# Patient Record
Sex: FEMALE | Race: BLACK OR AFRICAN AMERICAN | NOT HISPANIC OR LATINO | Employment: FULL TIME | URBAN - METROPOLITAN AREA
[De-identification: names, ages, dates, MRNs, and addresses within clinical notes are randomized per-mention and may not be internally consistent; named-entity substitution may affect disease eponyms.]

---

## 2019-03-16 ENCOUNTER — HOSPITAL ENCOUNTER (EMERGENCY)
Facility: HOSPITAL | Age: 34
Discharge: HOME | End: 2019-03-16
Attending: FAMILY MEDICINE
Payer: COMMERCIAL

## 2019-03-16 VITALS
TEMPERATURE: 99 F | OXYGEN SATURATION: 97 % | RESPIRATION RATE: 20 BRPM | SYSTOLIC BLOOD PRESSURE: 125 MMHG | DIASTOLIC BLOOD PRESSURE: 84 MMHG | HEART RATE: 133 BPM

## 2019-03-16 DIAGNOSIS — R50.9 FEVER: ICD-10-CM

## 2019-03-16 DIAGNOSIS — R68.89 FLU-LIKE SYMPTOMS: Primary | ICD-10-CM

## 2019-03-16 DIAGNOSIS — M79.602 LEFT ARM PAIN: ICD-10-CM

## 2019-03-16 LAB
INFLUENZA A, MOLECULAR: NEGATIVE
INFLUENZA B, MOLECULAR: NEGATIVE
SPECIMEN SOURCE: NORMAL

## 2019-03-16 PROCEDURE — 87502 INFLUENZA DNA AMP PROBE: CPT | Mod: ER

## 2019-03-16 PROCEDURE — 93010 ELECTROCARDIOGRAM REPORT: CPT | Mod: ,,, | Performed by: INTERNAL MEDICINE

## 2019-03-16 PROCEDURE — 25000003 PHARM REV CODE 250: Mod: ER | Performed by: PHYSICIAN ASSISTANT

## 2019-03-16 PROCEDURE — 93010 EKG 12-LEAD: ICD-10-PCS | Mod: ,,, | Performed by: INTERNAL MEDICINE

## 2019-03-16 PROCEDURE — 93005 ELECTROCARDIOGRAM TRACING: CPT | Mod: ER

## 2019-03-16 PROCEDURE — 99283 EMERGENCY DEPT VISIT LOW MDM: CPT | Mod: ER

## 2019-03-16 RX ORDER — ACETAMINOPHEN 325 MG/1
650 TABLET ORAL
Status: COMPLETED | OUTPATIENT
Start: 2019-03-16 | End: 2019-03-16

## 2019-03-16 RX ORDER — IBUPROFEN 600 MG/1
600 TABLET ORAL
Status: COMPLETED | OUTPATIENT
Start: 2019-03-16 | End: 2019-03-16

## 2019-03-16 RX ADMIN — ACETAMINOPHEN 650 MG: 325 TABLET ORAL at 12:03

## 2019-03-16 RX ADMIN — IBUPROFEN 600 MG: 600 TABLET, FILM COATED ORAL at 12:03

## 2019-03-16 NOTE — DISCHARGE INSTRUCTIONS
Rotate tylenol and motrin as needed for fever.  Rest. Drink plenty of fluids.  Follow up with your primary care provider.  For worsening symptoms, chest pain, shortness of breath, increased abdominal pain, high grade fever, stroke or stroke like symptoms, immediately go to the nearest Emergency Room or call 911 as soon as possible.

## 2019-03-16 NOTE — ED PROVIDER NOTES
Encounter Date: 3/16/2019       History     Chief Complaint   Patient presents with    Influenza     fever and chills since 12:00 am last night. Motrin last at 2:00 am      Patient is a 33-year-old female who presents with flu-like symptoms for approximately 12 hr.  She denies past medical history.  She states she had pain in the left arm yesterday and then developed a fever around midnight.  She took Motrin around 2:00 a.m. with no other attempted treatment.  She denies any other associated symptoms including cough, rhinorrhea, congestion, sore throat, nausea, diarrhea or urinary symptoms. She states she has an  and denies any known sick contacts.      The history is provided by the patient.     Review of patient's allergies indicates:  No Known Allergies  Past Medical History:   Diagnosis Date    Allergy      Past Surgical History:   Procedure Laterality Date    FOOT SURGERY      TONSILLECTOMY       Family History   Problem Relation Age of Onset    Hypertension Mother     Cancer Father     Cancer Paternal Grandmother     Cancer Paternal Grandfather      Social History     Tobacco Use    Smoking status: Never Smoker    Smokeless tobacco: Never Used   Substance Use Topics    Alcohol use: No    Drug use: No     Review of Systems   Constitutional: Positive for fever. Negative for activity change, appetite change and chills.   HENT: Negative for congestion, rhinorrhea and sore throat.    Eyes: Negative for redness and visual disturbance.   Respiratory: Negative for cough, chest tightness and shortness of breath.    Cardiovascular: Negative for chest pain.   Gastrointestinal: Negative for abdominal pain, diarrhea, nausea and vomiting.   Genitourinary: Negative for dysuria and frequency.   Musculoskeletal: Positive for myalgias. Negative for back pain, neck pain and neck stiffness.   Skin: Negative for rash.   Neurological: Negative for dizziness, syncope, numbness and headaches.       Physical Exam      Vitals:    03/16/19 1244 03/16/19 1331   BP: 125/74 125/84   BP Location:  Left arm   Patient Position:  Sitting   Pulse: (!) 164 (!) 133   Resp: 16 20   Temp: (!) 102.3 °F (39.1 °C) (!) 101.6 °F (38.7 °C)   TempSrc: Oral Oral   SpO2: 99% 97%       Physical Exam    Nursing note and vitals reviewed.  Constitutional: She appears well-developed and well-nourished. She is cooperative.  Non-toxic appearance. She does not have a sickly appearance.   HENT:   Head: Normocephalic and atraumatic.   Right Ear: External ear normal.   Left Ear: External ear normal.   Nose: Nose normal.   Mouth/Throat: Oropharynx is clear and moist.   Eyes: Conjunctivae and lids are normal. Pupils are equal, round, and reactive to light.   Neck: Normal range of motion and full passive range of motion without pain. Neck supple.   Cardiovascular: Regular rhythm and normal heart sounds. Tachycardia present.  Exam reveals no gallop and no friction rub.    No murmur heard.  Pulmonary/Chest: Breath sounds normal. She has no wheezes. She has no rhonchi. She has no rales.   Abdominal: Normal appearance.   Neurological: She is alert and oriented to person, place, and time.   Skin: Skin is warm, dry and intact. No rash noted.         ED Course   Procedures  Labs Reviewed   INFLUENZA A & B BY MOLECULAR     EKG Readings: (Independently Interpreted)   Rhythm: Sinus Tachycardia. Heart Rate: 157. Ectopy: No Ectopy. Conduction: Normal. Axis: Normal. Clinical Impression: Sinus Tachycardia       Imaging Results    None          Medical Decision Making:   Initial Assessment:   Patient is a 33-year-old female who presents with flu-like symptoms for approximately 12 hr.  She denies past medical history.  She states she had pain in the left arm yesterday and then developed a fever around midnight.  She took Motrin around 2:00 a.m. with no other attempted treatment.  She denies any other associated symptoms including cough, rhinorrhea, congestion, sore throat,  nausea, diarrhea or urinary symptoms. She states she has an  and denies any known sick contacts.  Differential Diagnosis:   Influenza  Pneumonia  Strep    Clinical Tests:   Lab Tests: Ordered and Reviewed  ED Management:  Urgent evaluation of a 33 year old female who presents with flu like symptoms. Vitals signs reviewed. Patient is well appearing. Abdomen is soft and non-tender with no rebound or guarding. I doubt acute intra-abdominal process. Bilateral TM's with no erythema. I doubt otitis media. No tonsillar swelling, erythema or exudate. I doubt strep pharyngitis. Breath sounds are clear and equal bilaterally. I doubt pneumonia. Fever improved with anti-pyretics. She reports pain to the left arm that has resolved. She denied shortness of breath or chest pain. Oral hydration and fever control discussed. Discussed results with patient. Return precautions given. Based on my clinical evaluation, I do not appreciate any immediate, emergent, or life threatening condition or etiology that warrants additional workup today and feel that the patient can be discharged with close follow up care.  Patient is to follow up with their primary care provider. All questions answered.                         Clinical Impression:       ICD-10-CM ICD-9-CM   1. Flu-like symptoms R68.89 780.99   2. Left arm pain M79.602 729.5   3. Fever R50.9 780.60                                Bonnie Stahl PA-C  03/16/19 4923

## 2019-03-26 ENCOUNTER — OFFICE VISIT (OUTPATIENT)
Dept: FAMILY MEDICINE | Facility: CLINIC | Age: 34
End: 2019-03-26
Payer: COMMERCIAL

## 2019-03-26 VITALS
WEIGHT: 153 LBS | OXYGEN SATURATION: 99 % | SYSTOLIC BLOOD PRESSURE: 134 MMHG | BODY MASS INDEX: 24.01 KG/M2 | TEMPERATURE: 99 F | HEART RATE: 96 BPM | DIASTOLIC BLOOD PRESSURE: 82 MMHG | HEIGHT: 67 IN

## 2019-03-26 DIAGNOSIS — R07.89 ATYPICAL CHEST PAIN: ICD-10-CM

## 2019-03-26 DIAGNOSIS — R94.31 ABNORMAL EKG: ICD-10-CM

## 2019-03-26 DIAGNOSIS — Z00.00 ROUTINE HEALTH MAINTENANCE: Primary | ICD-10-CM

## 2019-03-26 PROCEDURE — 99395 PR PREVENTIVE VISIT,EST,18-39: ICD-10-PCS | Mod: S$GLB,,, | Performed by: FAMILY MEDICINE

## 2019-03-26 PROCEDURE — 99395 PREV VISIT EST AGE 18-39: CPT | Mod: S$GLB,,, | Performed by: FAMILY MEDICINE

## 2019-03-26 NOTE — PROGRESS NOTES
" Patient ID: Alejandrina Eli is a 33 y.o. female.    Chief Complaint: Annual Exam    HPI      Alejandrina Eli is a 33 y.o. female. here for annual exam.   Seen at er for flu and pain in left side of neck and arm.  Came back after flu resolved.  No chest pain on exertion.  No PND orthopnea.  However pain is intermittent again on the left side of neck.    Seen by work md- 2 weeks prior for flu type illness          Review of Symptoms    Constitutional: Negative.    HENT: Negative.    Eyes: Negative.    Respiratory: Negative.    Cardiovascular: Negative.    Gastrointestinal: Negative.    Endocrine: Negative.    Genitourinary: Negative.    Musculoskeletal: Negative.    Skin: Negative.    Allergic/Immunologic: Negative.    Neurological: Negative.    Hematological: Negative.    Psychiatric/Behavioral: Negative.      Except as above in HPI      /82 (BP Location: Left arm, Patient Position: Sitting)   Pulse 96   Temp 98.6 °F (37 °C) (Oral)   Ht 5' 6.5" (1.689 m)   Wt 69.4 kg (153 lb)   LMP 03/12/2019 (Exact Date)   SpO2 99%   BMI 24.32 kg/m²     Physical  Exam    Constitutional:  Oriented to person, place, and time. Appears well-developed and well-nourished.     HENT:   Head: Normocephalic and atraumatic.     Right Ear: Tympanic membrane, ear canal and External ear normal     Left Ear: Tympanic membrane, ear canal and External ear normal     Nose: Nose normal. No rhinorrhea or nasal deformity.     Mouth/Throat: Uvula is midline, oropharynx is clear and moist and mucous membranes are normal.      Eyes: Conjunctivae are normal. Right eye exhibits no discharge. Left eye exhibits no discharge. No scleral icterus.     Neck:  No JVD present. No tracheal deviation  [x]  Neck supple.   []  No Carotid bruit    Cardiovascular:  Regular rate and rhythm with normal S1 and S2     Pulmonary/Chest:   Clear to auscultation bilaterally without wheezes, rhonchi or rales    Musculoskeletal: Normal range of motion. No edema " or tenderness.   No deformity     Lymphadenopathy:  No cervical adenopathy.     Neurological:  Alert and oriented to person, place, and time. Coordination normal.     Skin: Skin is warm and dry. No rash noted.     Psychiatric: Normal mood and affect. Speech is normal and behavior is normal. Judgment and thought content normal.     Complete Blood Count  Lab Results   Component Value Date    RBC 4.27 03/26/2019    HGB 12.7 03/26/2019    HCT 37.5 03/26/2019    MCV 87.8 03/26/2019    MCH 29.7 03/26/2019    MCHC 33.9 03/26/2019    RDW 13.1 03/26/2019     (H) 03/26/2019    MPV 10.1 03/26/2019    GRAN 2.3 09/24/2016    GRAN 49.1 09/24/2016    LYMPH 1,941 03/26/2019    LYMPH 42.2 03/26/2019    MONO 354 03/26/2019    MONO 7.7 03/26/2019     03/26/2019    BASO 18 03/26/2019    EOSINOPHIL 2.4 03/26/2019    BASOPHIL 0.4 03/26/2019    DIFFMETHOD Automated 09/24/2016       Comprehensive Metabolic Panel  Lab Results   Component Value Date    GLU 86 03/26/2019    BUN 7 03/26/2019    CREATININE 0.83 03/26/2019     03/26/2019    K 4.6 03/26/2019     03/26/2019    PROT 7.2 03/26/2019    ALBUMIN 4.2 03/26/2019    BILITOT 0.4 03/26/2019    AST 14 03/26/2019    ALKPHOS 63 03/26/2019    CO2 27 03/26/2019    ALT 11 03/26/2019    EGFRNONAA 94 03/26/2019    ESTGFRAFRICA 109 03/26/2019       TSH  Lab Results   Component Value Date    TSH 1.69 03/26/2019       Assessment / Plan:      ICD-10-CM ICD-9-CM   1. Routine health maintenance Z00.00 V70.0   2. Abnormal EKG R94.31 794.31   3. Atypical chest pain R07.89 786.59     Routine health maintenance  -     Comprehensive metabolic panel; Future  -     Lipid panel; Future  -     CBC auto differential; Future  -     TSH; Future    Abnormal EKG  -     Ambulatory referral to Cardiology    Atypical chest pain  -     Ambulatory referral to Cardiology      Low likelihood but continued left neck pain and slightly abnormal EKG-suggest cardiology follow-up    Discussed how to stay  healthy including: diet, exercise, refraining from smoking and discussed screening exams / tests needed for age, sex and family Hx.  Answers for HPI/ROS submitted by the patient on 3/22/2019   activity change: No  unexpected weight change: No  neck pain: No  hearing loss: No  rhinorrhea: No  trouble swallowing: No  eye discharge: No  visual disturbance: No  chest tightness: No  wheezing: No  chest pain: No  palpitations: No  blood in stool: No  constipation: No  vomiting: No  diarrhea: No  polydipsia: No  polyuria: No  difficulty urinating: No  hematuria: No  menstrual problem: No  dysuria: No  joint swelling: No  arthralgias: No  headaches: No  weakness: No  confusion: No  dysphoric mood: No

## 2019-03-27 LAB
ALBUMIN SERPL-MCNC: 4.2 G/DL (ref 3.6–5.1)
ALBUMIN/GLOB SERPL: 1.4 (CALC) (ref 1–2.5)
ALP SERPL-CCNC: 63 U/L (ref 33–115)
ALT SERPL-CCNC: 11 U/L (ref 6–29)
AST SERPL-CCNC: 14 U/L (ref 10–30)
BASOPHILS # BLD AUTO: 18 CELLS/UL (ref 0–200)
BASOPHILS NFR BLD AUTO: 0.4 %
BILIRUB SERPL-MCNC: 0.4 MG/DL (ref 0.2–1.2)
BUN SERPL-MCNC: 7 MG/DL (ref 7–25)
BUN/CREAT SERPL: NORMAL (CALC) (ref 6–22)
CALCIUM SERPL-MCNC: 9.3 MG/DL (ref 8.6–10.2)
CHLORIDE SERPL-SCNC: 104 MMOL/L (ref 98–110)
CHOLEST SERPL-MCNC: 166 MG/DL
CHOLEST/HDLC SERPL: 4.6 (CALC)
CO2 SERPL-SCNC: 27 MMOL/L (ref 20–32)
CREAT SERPL-MCNC: 0.83 MG/DL (ref 0.5–1.1)
EOSINOPHIL # BLD AUTO: 110 CELLS/UL (ref 15–500)
EOSINOPHIL NFR BLD AUTO: 2.4 %
ERYTHROCYTE [DISTWIDTH] IN BLOOD BY AUTOMATED COUNT: 13.1 % (ref 11–15)
GFRSERPLBLD MDRD-ARVRAT: 94 ML/MIN/1.73M2
GLOBULIN SER CALC-MCNC: 3 G/DL (CALC) (ref 1.9–3.7)
GLUCOSE SERPL-MCNC: 86 MG/DL (ref 65–99)
HCT VFR BLD AUTO: 37.5 % (ref 35–45)
HDLC SERPL-MCNC: 36 MG/DL
HGB BLD-MCNC: 12.7 G/DL (ref 11.7–15.5)
LDLC SERPL CALC-MCNC: 110 MG/DL (CALC)
LYMPHOCYTES # BLD AUTO: 1941 CELLS/UL (ref 850–3900)
LYMPHOCYTES NFR BLD AUTO: 42.2 %
MCH RBC QN AUTO: 29.7 PG (ref 27–33)
MCHC RBC AUTO-ENTMCNC: 33.9 G/DL (ref 32–36)
MCV RBC AUTO: 87.8 FL (ref 80–100)
MONOCYTES # BLD AUTO: 354 CELLS/UL (ref 200–950)
MONOCYTES NFR BLD AUTO: 7.7 %
NEUTROPHILS # BLD AUTO: 2176 CELLS/UL (ref 1500–7800)
NEUTROPHILS NFR BLD AUTO: 47.3 %
NONHDLC SERPL-MCNC: 130 MG/DL (CALC)
PLATELET # BLD AUTO: 448 THOUSAND/UL (ref 140–400)
PMV BLD REES-ECKER: 10.1 FL (ref 7.5–12.5)
POTASSIUM SERPL-SCNC: 4.6 MMOL/L (ref 3.5–5.3)
PROT SERPL-MCNC: 7.2 G/DL (ref 6.1–8.1)
RBC # BLD AUTO: 4.27 MILLION/UL (ref 3.8–5.1)
SODIUM SERPL-SCNC: 140 MMOL/L (ref 135–146)
TRIGL SERPL-MCNC: 98 MG/DL
TSH SERPL-ACNC: 1.69 MIU/L
WBC # BLD AUTO: 4.6 THOUSAND/UL (ref 3.8–10.8)

## 2019-04-01 ENCOUNTER — TELEPHONE (OUTPATIENT)
Dept: ADMINISTRATIVE | Facility: HOSPITAL | Age: 34
End: 2019-04-01

## 2019-04-22 ENCOUNTER — OFFICE VISIT (OUTPATIENT)
Dept: CARDIOLOGY | Facility: CLINIC | Age: 34
End: 2019-04-22
Payer: COMMERCIAL

## 2019-04-22 VITALS
OXYGEN SATURATION: 98 % | HEART RATE: 91 BPM | HEIGHT: 66 IN | BODY MASS INDEX: 23.6 KG/M2 | DIASTOLIC BLOOD PRESSURE: 91 MMHG | WEIGHT: 146.88 LBS | SYSTOLIC BLOOD PRESSURE: 132 MMHG

## 2019-04-22 DIAGNOSIS — Z82.49 FAMILY HISTORY OF CORONARY ARTERIOSCLEROSIS: ICD-10-CM

## 2019-04-22 DIAGNOSIS — R94.31 NONSPECIFIC ABNORMAL ELECTROCARDIOGRAM (ECG) (EKG): Primary | ICD-10-CM

## 2019-04-22 PROCEDURE — 3008F BODY MASS INDEX DOCD: CPT | Mod: CPTII,S$GLB,, | Performed by: INTERNAL MEDICINE

## 2019-04-22 PROCEDURE — 99204 PR OFFICE/OUTPT VISIT, NEW, LEVL IV, 45-59 MIN: ICD-10-PCS | Mod: S$GLB,,, | Performed by: INTERNAL MEDICINE

## 2019-04-22 PROCEDURE — 99204 OFFICE O/P NEW MOD 45 MIN: CPT | Mod: S$GLB,,, | Performed by: INTERNAL MEDICINE

## 2019-04-22 PROCEDURE — 99999 PR PBB SHADOW E&M-EST. PATIENT-LVL III: CPT | Mod: PBBFAC,,, | Performed by: INTERNAL MEDICINE

## 2019-04-22 PROCEDURE — 99999 PR PBB SHADOW E&M-EST. PATIENT-LVL III: ICD-10-PCS | Mod: PBBFAC,,, | Performed by: INTERNAL MEDICINE

## 2019-04-22 PROCEDURE — 3008F PR BODY MASS INDEX (BMI) DOCUMENTED: ICD-10-PCS | Mod: CPTII,S$GLB,, | Performed by: INTERNAL MEDICINE

## 2019-04-22 NOTE — PROGRESS NOTES
Subjective:   Patient ID:  Alejandrina Eli is a 33 y.o. female who presents for evaluation of Follow-up (ER)      HPI: 34 y/o female with no significant PMH present with complaints of left arm pain worrying for her heart. As per patient about mid March she went to the ER with fever/muscle aches and pain associated with left arm pain. ECG showed Sinus tachycardia. She denies any associated coughing, congestion, sore throat or dyspnea. She had some light headedness but no syncope. She is feeling better now but continues to have pain in left shoulder and left arm. She work as an  at [employer]. She walk regularly and play basketball with her son and does not have any worsening arm pain or chest pain. BP is controlled. HR is better today.     Past Medical History:   Diagnosis Date    Allergy        Past Surgical History:   Procedure Laterality Date    FOOT SURGERY      TONSILLECTOMY         Social History     Tobacco Use    Smoking status: Never Smoker    Smokeless tobacco: Never Used   Substance Use Topics    Alcohol use: No    Drug use: No       Family History   Problem Relation Age of Onset    Hypertension Mother     Cancer Father     Cancer Paternal Grandmother     Cancer Paternal Grandfather        Patient's Medications    No medications on file        Review of patient's allergies indicates:  No Known Allergies    Review of Systems   Constitution: Negative.   HENT: Negative.    Eyes: Negative.    Cardiovascular: Negative.    Respiratory: Negative.    Endocrine: Negative.    Hematologic/Lymphatic: Negative.    Skin: Negative.    Musculoskeletal: Positive for joint pain.   Gastrointestinal: Negative.    Neurological: Negative.    Psychiatric/Behavioral: Negative.    Allergic/Immunologic: Negative.      Objective:   Physical Exam   Constitutional: She is oriented to person, place, and time. She appears well-developed and well-nourished. No distress.   Examination of the digits showed no clubbing  or cyanosis   HENT:   Head: Normocephalic and atraumatic.   Eyes: Pupils are equal, round, and reactive to light. Conjunctivae are normal. Right eye exhibits no discharge.   Neck: Normal range of motion. Neck supple. No JVD present. No thyromegaly present.   No carotid bruits   Cardiovascular: Normal rate, regular rhythm, S1 normal, S2 normal, normal heart sounds, intact distal pulses and normal pulses. PMI is not displaced. Exam reveals no gallop, no friction rub and no opening snap.   No murmur heard.  Pulmonary/Chest: Effort normal and breath sounds normal. No respiratory distress. She has no wheezes. She has no rales. She exhibits no tenderness.   Abdominal: Soft. Bowel sounds are normal. She exhibits no distension and no mass. There is no tenderness. There is no guarding.   No hepatosplenomegaly   Musculoskeletal: Normal range of motion. She exhibits no edema or tenderness.   Lymphadenopathy:     She has no cervical adenopathy.   Neurological: She is alert and oriented to person, place, and time.   Skin: Skin is warm. No rash noted. She is not diaphoretic. No erythema.   Psychiatric: She has a normal mood and affect.   Nursing note and vitals reviewed.      Lab Results   Component Value Date    WBC 4.6 03/26/2019    HGB 12.7 03/26/2019    HCT 37.5 03/26/2019    MCV 87.8 03/26/2019     (H) 03/26/2019         Chemistry        Component Value Date/Time     03/26/2019 1157    K 4.6 03/26/2019 1157     03/26/2019 1157    CO2 27 03/26/2019 1157    BUN 7 03/26/2019 1157    CREATININE 0.83 03/26/2019 1157    GLU 86 03/26/2019 1157        Component Value Date/Time    CALCIUM 9.3 03/26/2019 1157    ALKPHOS 63 03/26/2019 1157    AST 14 03/26/2019 1157    ALT 11 03/26/2019 1157    BILITOT 0.4 03/26/2019 1157    ESTGFRAFRICA 109 03/26/2019 1157    EGFRNONAA 94 03/26/2019 1157            Lab Results   Component Value Date    CHOL 166 03/26/2019     Lab Results   Component Value Date    HDL 36 (L)  03/26/2019     Lab Results   Component Value Date    LDLCALC 110 (H) 03/26/2019     Lab Results   Component Value Date    TRIG 98 03/26/2019     Lab Results   Component Value Date    CHOLHDL 4.6 03/26/2019       Lab Results   Component Value Date    TSH 1.69 03/26/2019       No results found for: HGBA1C    ECGs reviewed- Sinus tachycardia  LABS reviewed      Assessment:     1. Nonspecific abnormal electrocardiogram (ECG) (EKG)    2. Family history of coronary arteriosclerosis        Plan:   No concerns for ACS or coronary etiology  2d echo complete for abnormal ECG showing sinus tachycardia  Feeling better today  Continue Activity as tolerate  F/u as needed. Call if results abnormal.

## 2019-05-06 ENCOUNTER — HOSPITAL ENCOUNTER (OUTPATIENT)
Dept: CARDIOLOGY | Facility: HOSPITAL | Age: 34
Discharge: HOME | End: 2019-05-06
Attending: INTERNAL MEDICINE
Payer: COMMERCIAL

## 2019-05-06 DIAGNOSIS — Z82.49 FAMILY HISTORY OF CORONARY ARTERIOSCLEROSIS: ICD-10-CM

## 2019-05-06 DIAGNOSIS — R94.31 NONSPECIFIC ABNORMAL ELECTROCARDIOGRAM (ECG) (EKG): ICD-10-CM

## 2019-05-06 LAB
AORTIC ROOT ANNULUS: 2.63 CM
AORTIC VALVE CUSP SEPERATION: 1.93 CM
CV ECHO LV RWT: 0.35 CM
DOP CALC LVOT AREA: 3.2 CM2
DOP CALC LVOT DIAMETER: 2.02 CM
DOP CALC LVOT PEAK VEL: 0.98 M/S
DOP CALC LVOT STROKE VOLUME: 62.33 CM3
DOP CALCLVOT PEAK VEL VTI: 19.46 CM
E WAVE DECELERATION TIME: 116.57 MSEC
E/A RATIO: 1.9
ECHO LV POSTERIOR WALL: 0.75 CM (ref 0.6–1.1)
FRACTIONAL SHORTENING: 25 % (ref 28–44)
INTERVENTRICULAR SEPTUM: 0.69 CM (ref 0.6–1.1)
LA MAJOR: 4.36 CM
LA MINOR: 3.91 CM
LEFT ATRIUM SIZE: 2.73 CM
LEFT INTERNAL DIMENSION IN SYSTOLE: 3.18 CM (ref 2.1–4)
LEFT VENTRICLE DIASTOLIC VOLUME: 81.04 ML
LEFT VENTRICLE SYSTOLIC VOLUME: 40.39 ML
LEFT VENTRICULAR INTERNAL DIMENSION IN DIASTOLE: 4.26 CM (ref 3.5–6)
LEFT VENTRICULAR MASS: 90.36 G
MV PEAK A VEL: 0.59 M/S
MV PEAK E VEL: 1.12 M/S
PV PEAK VELOCITY: 1.11 CM/S
RA MAJOR: 3.64 CM
RA PRESSURE: 3 MMHG
RA WIDTH: 3.3 CM
RIGHT VENTRICULAR END-DIASTOLIC DIMENSION: 2.28 CM
SINUS: 2.5 CM

## 2019-05-06 PROCEDURE — 93306 TRANSTHORACIC ECHO (TTE) COMPLETE (CUPID ONLY): ICD-10-PCS | Mod: 26,,, | Performed by: INTERNAL MEDICINE

## 2019-05-06 PROCEDURE — 93306 TTE W/DOPPLER COMPLETE: CPT | Mod: 26,,, | Performed by: INTERNAL MEDICINE

## 2019-05-06 PROCEDURE — 93306 TTE W/DOPPLER COMPLETE: CPT | Mod: PO

## 2019-07-01 ENCOUNTER — TELEPHONE (OUTPATIENT)
Dept: FAMILY MEDICINE | Facility: CLINIC | Age: 34
End: 2019-07-01

## 2019-07-01 NOTE — TELEPHONE ENCOUNTER
----- Message from Zelda Humphrey sent at 7/1/2019  4:14 PM CDT -----  Contact:  Jacky cell [phone]  Patient's  stopped by office. Thinks patient is going through some post-partum depression. Was advised by pt's OB/GYN to contact pcp.    requesting returned call from Dr Tim

## 2019-07-01 NOTE — TELEPHONE ENCOUNTER
I spoke with the pt .   [place]  And he is concerned about some of her decisions  she has made  Would just like to discuss with dr ledezma a little further

## 2019-07-03 NOTE — TELEPHONE ENCOUNTER
Spoke with   He believes that his depressed and thinking clearly.  She left and moved to [place] and living with another person/woman in what he thinks may be some type cult.  Wonders the intentions of the people she is living with. Money scheme?  Wants to know what to do    Suggested calling  to see if he could give suggestions

## 2019-07-15 ENCOUNTER — TELEPHONE (OUTPATIENT)
Dept: FAMILY MEDICINE | Facility: CLINIC | Age: 34
End: 2019-07-15

## 2019-07-15 NOTE — TELEPHONE ENCOUNTER
She can go through the normal channels to get the paperwork of the visit.    All at the visit stand for itself.  I am not going to write an additional letter saying she does not have mental illness 

## 2019-07-15 NOTE — TELEPHONE ENCOUNTER
Spoke to pt and she was notified she has to get her medical records from our medical record department. Pt understood.

## 2019-07-15 NOTE — TELEPHONE ENCOUNTER
----- Message from Petervirgie Sanders sent at 7/15/2019 10:57 AM CDT -----  Contact: [phone]  She'd like to speak to the nurse about some medical records from the previous visit.    I spoke with the pt - she would like a copy of her last visit and a note stating that at her last visit you saw no signs of any mental illness ( see previous phone call between the  and I on July 1)

## 2019-12-04 ENCOUNTER — OFFICE VISIT (OUTPATIENT)
Dept: INTERNAL MEDICINE CLINIC | Facility: CLINIC | Age: 34
End: 2019-12-04
Payer: COMMERCIAL

## 2019-12-04 VITALS
SYSTOLIC BLOOD PRESSURE: 138 MMHG | OXYGEN SATURATION: 98 % | HEIGHT: 66 IN | WEIGHT: 125 LBS | BODY MASS INDEX: 20.09 KG/M2 | HEART RATE: 88 BPM | DIASTOLIC BLOOD PRESSURE: 96 MMHG

## 2019-12-04 DIAGNOSIS — I10 ESSENTIAL HYPERTENSION: Primary | ICD-10-CM

## 2019-12-04 DIAGNOSIS — K59.04 CHRONIC IDIOPATHIC CONSTIPATION: ICD-10-CM

## 2019-12-04 DIAGNOSIS — Z80.0 FAMILY HISTORY OF COLON CANCER: ICD-10-CM

## 2019-12-04 DIAGNOSIS — I47.9 PAROXYSMAL TACHYCARDIA (HCC): ICD-10-CM

## 2019-12-04 PROCEDURE — 99204 OFFICE O/P NEW MOD 45 MIN: CPT | Performed by: INTERNAL MEDICINE

## 2019-12-04 RX ORDER — MULTIVITAMIN
1 TABLET ORAL DAILY
COMMUNITY

## 2019-12-04 RX ORDER — IBUPROFEN 200 MG
TABLET ORAL EVERY 6 HOURS PRN
COMMUNITY

## 2019-12-04 RX ORDER — AMLODIPINE BESYLATE 2.5 MG/1
2.5 TABLET ORAL DAILY
Qty: 90 TABLET | Refills: 3 | Status: SHIPPED | OUTPATIENT
Start: 2019-12-04 | End: 2019-12-18

## 2019-12-04 NOTE — PROGRESS NOTES
INTERNAL MEDICINE OFFICE VISIT  [facility] AT [facility], [address]  Tel: [phone]      NAME: Starling Saint  AGE: 30 y o  SEX: female  : 1985   MRN: [id number]    DATE: 2019  TIME: 7:06 PM      Assessment and Plan:  1  Essential hypertension  She was started on amlodipine 2 5 mg daily  She was told to monitor blood pressure at home and I will see her back in a month  - amLODIPine (NORVASC) 2 5 mg tablet; Take 1 tablet (2 5 mg total) by mouth daily  Dispense: 90 tablet; Refill: 3  - CBC and differential; Future  - Comprehensive metabolic panel; Future  - Lipid panel; Future  - TSH, 3rd generation; Future    2  Paroxysmal tachycardia ([address] )  Will get back with results of Echo    - Echo complete with contrast if indicated; Future    3  Chronic idiopathic constipation  She was told to increase the water intake and fiber in the diet    4  Family history of colon cancer    - Ambulatory referral to Gastroenterology; Future      - Counseling Documentation: patient was counseled regarding: instructions for management, risk factor reductions, prognosis, patient and family education, impressions, risks and benefits of treatment options and importance of compliance with treatment  - Medication Side Effects: Adverse side effects of medications were reviewed with the patient/guardian today  Return for follow up visit in 1 month or earlier, if needed  Chief Complaint:  Chief Complaint   Patient presents with    Establish Care         History of Present Illness: This is a new patient who is here to establish  Hypertension-she was on medication in the past when he she was pregnant about 10 years ago  Recently about a year ago when she was pregnant with her twins, her blood pressure started to elevate again  Now she also has headaches and dizziness off and on with the increase in blood pressure    Tachycardia-she feels that her heart beats faster and she has palpitations off and on  Constipation-has constipation but does not take any medication for it  Family history of colon cancer-her grandfather had colon cancer and she is concerned  Active Problem List:  Patient Active Problem List   Diagnosis    Essential hypertension    Family history of colon cancer    Chronic idiopathic constipation    Paroxysmal tachycardia ([address] )         Past Medical History:  History reviewed  No pertinent past medical history        Past Surgical History:  Past Surgical History:   Procedure Laterality Date    HEEL SPUR EXCISION Right 2016    TONSILLECTOMY  1979    TUBAL LIGATION  10/01/2018         Family History:  Family History   Problem Relation Age of Onset    Hypertension Mother     Hypertension Brother     Asthma Brother          Social History:  Social History     Socioeconomic History    Marital status: Legally      Spouse name: None    Number of children: None    Years of education: None    Highest education level: None   Occupational History    None   Social Needs    Financial resource strain: None    Food insecurity:     Worry: None     Inability: None    Transportation needs:     Medical: None     Non-medical: None   Tobacco Use    Smoking status: Never Smoker    Smokeless tobacco: Never Used   Substance and Sexual Activity    Alcohol use: Never     Frequency: Never    Drug use: Never    Sexual activity: Not Currently     Partners: Male   Lifestyle    Physical activity:     Days per week: None     Minutes per session: None    Stress: None   Relationships    Social connections:     Talks on phone: None     Gets together: None     Attends [employer] service: None     Active member of club or organization: None     Attends meetings of clubs or organizations: None     Relationship status: None    Intimate partner violence:     Fear of current or ex partner: None     Emotionally abused: None     Physically abused: None     Forced sexual activity: None   Other Topics Concern    None   Social History Narrative    None         Allergies:  No Known Allergies      Medications:    Current Outpatient Medications:     ibuprofen (MOTRIN) 200 mg tablet, Take by mouth every 6 (six) hours as needed for mild pain, Disp: , Rfl:     Multiple Vitamin (MULTIVITAMIN) tablet, Take 1 tablet by mouth daily, Disp: , Rfl:     amLODIPine (NORVASC) 2 5 mg tablet, Take 1 tablet (2 5 mg total) by mouth daily, Disp: 90 tablet, Rfl: 3      The following portions of the patient's history were reviewed and updated as appropriate: past medical history, past surgical history, family history, social history, allergies, current medications and active problem list       Review of Systems:  Constitutional: Denies fever, chills, weight gain, weight loss, fatigue  Eyes: Denies eye redness, eye discharge, double vision, change in visual acuity  ENT: Denies hearing loss, tinnitus, sneezing, nasal congestion, nasal discharge, sore throat   Respiratory: Denies cough, expectoration, hemoptysis, shortness of breath, wheezing  Cardiovascular: Denies chest pain, palpitations, lower extremity swelling, orthopnea, PND  Gastrointestinal: Denies abdominal pain, heartburn, nausea, vomiting, hematemesis, diarrhea, bloody stools  Genito-Urinary: Denies dysuria, frequency, difficulty in micturition, nocturia, incontinence  Musculoskeletal: Denies back pain, joint pain, muscle pain  Neurologic: Denies confusion, lightheadedness, syncope,  focal weakness, sensory changes, seizures   Has headache off and on  Endocrine: Denies polyuria, polydipsia, temperature intolerance  Allergy and Immunology: Denies hives, insect bite sensitivity  Hematological and Lymphatic: Denies bleeding problems, swollen glands   Psychological: Denies depression, suicidal ideation, anxiety, panic, mood swings  Dermatological: Denies pruritus, rash, skin lesion changes      Vitals:  Vitals:    12/04/19 1547   BP: 138/96   Pulse: 88   SpO2: 98%       Body mass index is 20 33 kg/m²  Weight (last 2 days)     Date/Time   Weight    12/04/19 1547   56 7 (125)                Physical Examination:  General: Patient is not in acute distress  Awake, alert, responding to commands  No weight gain or loss  Head: Normocephalic  Atraumatic  Eyes: Conjunctiva and lids with no swelling, erythema or discharge  Both pupils normal sized, round and reactive to light  Sclera nonicteric  ENT: External examination of nose and ear normal  Otoscopic examination shows translucent tympanic membranes with patent canals without erythema  Oropharynx moist with no erythema, edema, exudate or lesions  Neck: Supple  JVP not raised  Trachea midline  No masses  No thyromegaly  Lungs: No signs of increased work of breathing or respiratory distress  Bilateral bronchovascular breath sounds with no crackles or rhonchi  Chest wall: No tenderness  Cardiovascular: Normal PMI  No thrills  Regular rate and rhythm  S1 and S2 normal  No murmur, rub or gallop  Gastrointestinal: Abdomen soft, nontender  No guarding or rigidity  Liver and spleen not palpable  Bowel sounds present  Neurologic: Cranial nerves II-XII intact   Cortical functions normal  Motor system - Reflexes 2+ and symmetrical  Sensations normal  Musculoskeletal: Gait normal  No joint tenderness  Integumentary: Skin normal with no rash or lesions  Lymphatic: No palpable lymph nodes in neck, axilla or groin  Extremities: No clubbing, cyanosis, edema or varicosities  Psychological: Judgement and insight normal  Mood and affect normal      Laboratory Results:  CBC with diff:   No results found for: WBC, RBC, HGB, HCT, MCV, MCH, RDW, PLT    CMP:  No results found for: CREATININE, BUN, NA, K, CL, CO2, GLUCOSE, PROT, ALKPHOS, ALT, AST, BILIDIR    No results found for: HGBA1C, MG, PHOS    No results found for: TROPONINI, CKMB, CKTOTAL    Lipid Profile:   No results found for: CHOL  No results found for: HDL  No results found for: LDLCALC  No results found for: TRIG    Imaging Results:  No image results found  Health Maintenance:  Health Maintenance   Topic Date Due    DTaP,Tdap,and Td Vaccines (1 - Tdap) 07/05/1998    HIV Screening  07/05/2002    BMI: Adult  07/05/2005    Cervical Cancer Screening  07/05/2008    Influenza Vaccine  07/01/2019    Depression Screening PHQ  12/04/2020    Pneumococcal Vaccine: 65+ Years (1 of 2 - PCV13) 07/05/2052    Pneumococcal Vaccine: Pediatrics (0 to 5 Years) and At-Risk Patients (6 to 59 Years)  Aged Out    HIB Vaccine  Aged Out    Hepatitis B Vaccine  Aged Out    IPV Vaccine  Aged Out    Hepatitis A Vaccine  Aged Out    Meningococcal ACWY Vaccine  Aged Out    HPV Vaccine  Aged Out       There is no immunization history on file for this patient        Allyson Faye MD  12/4/2019,7:06 PM

## 2019-12-12 ENCOUNTER — HOSPITAL ENCOUNTER (OUTPATIENT)
Dept: NON INVASIVE DIAGNOSTICS | Facility: CLINIC | Age: 34
Discharge: HOME | End: 2019-12-12
Payer: COMMERCIAL

## 2019-12-12 ENCOUNTER — TELEPHONE (OUTPATIENT)
Dept: INTERNAL MEDICINE CLINIC | Facility: CLINIC | Age: 34
End: 2019-12-12

## 2019-12-12 DIAGNOSIS — I47.9 PAROXYSMAL TACHYCARDIA (HCC): ICD-10-CM

## 2019-12-12 PROCEDURE — 93306 TTE W/DOPPLER COMPLETE: CPT

## 2019-12-12 PROCEDURE — 93306 TTE W/DOPPLER COMPLETE: CPT | Performed by: INTERNAL MEDICINE

## 2019-12-12 NOTE — TELEPHONE ENCOUNTER
----- Message from Wenceslao Middleton MD sent at 12/12/2019  4:54 PM EST -----  Echo of the heart was normal

## 2019-12-18 ENCOUNTER — APPOINTMENT (OUTPATIENT)
Dept: LAB | Facility: CLINIC | Age: 34
End: 2019-12-18
Payer: COMMERCIAL

## 2019-12-18 ENCOUNTER — OFFICE VISIT (OUTPATIENT)
Dept: INTERNAL MEDICINE CLINIC | Facility: CLINIC | Age: 34
End: 2019-12-18
Payer: COMMERCIAL

## 2019-12-18 VITALS
OXYGEN SATURATION: 98 % | BODY MASS INDEX: 20.76 KG/M2 | HEIGHT: 66 IN | SYSTOLIC BLOOD PRESSURE: 144 MMHG | HEART RATE: 138 BPM | DIASTOLIC BLOOD PRESSURE: 104 MMHG | WEIGHT: 129.2 LBS

## 2019-12-18 DIAGNOSIS — I10 ESSENTIAL HYPERTENSION: ICD-10-CM

## 2019-12-18 DIAGNOSIS — I47.9 PAROXYSMAL TACHYCARDIA (HCC): Primary | ICD-10-CM

## 2019-12-18 DIAGNOSIS — I47.9 PAROXYSMAL TACHYCARDIA (HCC): ICD-10-CM

## 2019-12-18 DIAGNOSIS — K59.04 CHRONIC IDIOPATHIC CONSTIPATION: ICD-10-CM

## 2019-12-18 LAB
BASOPHILS # BLD AUTO: 0.02 THOUSANDS/ΜL (ref 0–0.1)
BASOPHILS NFR BLD AUTO: 0 % (ref 0–1)
EOSINOPHIL # BLD AUTO: 0.02 THOUSAND/ΜL (ref 0–0.61)
EOSINOPHIL NFR BLD AUTO: 0 % (ref 0–6)
ERYTHROCYTE [DISTWIDTH] IN BLOOD BY AUTOMATED COUNT: 12.7 % (ref 11.6–15.1)
HCT VFR BLD AUTO: 38.5 % (ref 34.8–46.1)
HGB BLD-MCNC: 12.8 G/DL (ref 11.5–15.4)
IMM GRANULOCYTES # BLD AUTO: 0.01 THOUSAND/UL (ref 0–0.2)
IMM GRANULOCYTES NFR BLD AUTO: 0 % (ref 0–2)
LYMPHOCYTES # BLD AUTO: 1.66 THOUSANDS/ΜL (ref 0.6–4.47)
LYMPHOCYTES NFR BLD AUTO: 30 % (ref 14–44)
MCH RBC QN AUTO: 30.3 PG (ref 26.8–34.3)
MCHC RBC AUTO-ENTMCNC: 33.2 G/DL (ref 31.4–37.4)
MCV RBC AUTO: 91 FL (ref 82–98)
MONOCYTES # BLD AUTO: 0.44 THOUSAND/ΜL (ref 0.17–1.22)
MONOCYTES NFR BLD AUTO: 8 % (ref 4–12)
NEUTROPHILS # BLD AUTO: 3.48 THOUSANDS/ΜL (ref 1.85–7.62)
NEUTS SEG NFR BLD AUTO: 62 % (ref 43–75)
NRBC BLD AUTO-RTO: 0 /100 WBCS
PLATELET # BLD AUTO: 283 THOUSANDS/UL (ref 149–390)
PMV BLD AUTO: 10.6 FL (ref 8.9–12.7)
RBC # BLD AUTO: 4.23 MILLION/UL (ref 3.81–5.12)
WBC # BLD AUTO: 5.63 THOUSAND/UL (ref 4.31–10.16)

## 2019-12-18 PROCEDURE — 93000 ELECTROCARDIOGRAM COMPLETE: CPT | Performed by: INTERNAL MEDICINE

## 2019-12-18 PROCEDURE — 1036F TOBACCO NON-USER: CPT | Performed by: INTERNAL MEDICINE

## 2019-12-18 PROCEDURE — 84436 ASSAY OF TOTAL THYROXINE: CPT

## 2019-12-18 PROCEDURE — 85025 COMPLETE CBC W/AUTO DIFF WBC: CPT

## 2019-12-18 PROCEDURE — 84480 ASSAY TRIIODOTHYRONINE (T3): CPT

## 2019-12-18 PROCEDURE — 80053 COMPREHEN METABOLIC PANEL: CPT

## 2019-12-18 PROCEDURE — 84443 ASSAY THYROID STIM HORMONE: CPT

## 2019-12-18 PROCEDURE — 36415 COLL VENOUS BLD VENIPUNCTURE: CPT

## 2019-12-18 PROCEDURE — 80061 LIPID PANEL: CPT

## 2019-12-18 PROCEDURE — 99214 OFFICE O/P EST MOD 30 MIN: CPT | Performed by: INTERNAL MEDICINE

## 2019-12-18 PROCEDURE — 3008F BODY MASS INDEX DOCD: CPT | Performed by: INTERNAL MEDICINE

## 2019-12-18 RX ORDER — METOPROLOL SUCCINATE 50 MG/1
50 TABLET, EXTENDED RELEASE ORAL DAILY
Qty: 90 TABLET | Refills: 1 | Status: SHIPPED | OUTPATIENT
Start: 2019-12-18 | End: 2020-05-11 | Stop reason: SDUPTHER

## 2019-12-18 NOTE — PROGRESS NOTES
INTERNAL MEDICINE OFFICE VISIT  [facility], [facility], [address] [facility]  Tel: [phone]      NAME: Richy Tim  AGE: 30 y o  SEX: female  : 1985   MRN: [id number]    DATE: 2019  TIME: 7:12 PM      Assessment and Plan:  1  Paroxysmal tachycardia ([address] )   patient is experiencing frequent episodes of fast heartbeat  An EKG was done in the office which showed a heart rate of 119  Patient is having palpitations  She was started on metoprolol which will decrease the heart rate  If her symptoms continue to get worse, she will go to the ER  I will also follow up with results of the thyroid function tests as she is complaining weight loss despite eating a lot  - POCT ECG  - T4; Future  - T3; Future    2  Essential hypertension    Amlodipine was discontinued and she was started on metoprolol keeping in mind that she has tachycardia as well as high blood pressure  I will see her back in a month  - metoprolol succinate (TOPROL-XL) 50 mg 24 hr tablet; Take 1 tablet (50 mg total) by mouth daily  Dispense: 90 tablet; Refill: 1    3  Chronic idiopathic constipation    Much better, she was encouraged to continue to increase the fluid and fiber intake in her diet  - Counseling Documentation: patient was counseled regarding: instructions for management, risk factor reductions, prognosis, patient and family education, risks and benefits of treatment options and importance of compliance with treatment  - Medication Side Effects: Adverse side effects of medications were reviewed with the patient/guardian today  Return for follow up visit in   One month or earlier, if needed  Chief Complaint:  Chief Complaint   Patient presents with    Follow-up     Palpatations         History of Present Illness:    tachycardia- patient has been experiencing episodes of fast heart rate along with dizziness and  Nausea    This is increasing in frequency recently  Hypertension-she has been taking amlodipine 2 5 mg daily but her blood pressure is on the higher side  Constipation -much better after she increase the fluid intake in her diet  Active Problem List:  Patient Active Problem List   Diagnosis    Essential hypertension    Family history of colon cancer    Chronic idiopathic constipation    Paroxysmal tachycardia ([address] )         Past Medical History:  History reviewed  No pertinent past medical history        Past Surgical History:  Past Surgical History:   Procedure Laterality Date    HEEL SPUR EXCISION Right 2016    TONSILLECTOMY  1979    TUBAL LIGATION  10/01/2018         Family History:  Family History   Problem Relation Age of Onset    Hypertension Mother     Hypertension Brother     Asthma Brother          Social History:  Social History     Socioeconomic History    Marital status: Legally      Spouse name: None    Number of children: None    Years of education: None    Highest education level: None   Occupational History    None   Social Needs    Financial resource strain: None    Food insecurity:     Worry: None     Inability: None    Transportation needs:     Medical: None     Non-medical: None   Tobacco Use    Smoking status: Never Smoker    Smokeless tobacco: Never Used   Substance and Sexual Activity    Alcohol use: Never     Frequency: Never    Drug use: Never    Sexual activity: Not Currently     Partners: Male   Lifestyle    Physical activity:     Days per week: 0 days     Minutes per session: 0 min    Stress: None   Relationships    Social connections:     Talks on phone: None     Gets together: None     Attends [employer] service: None     Active member of club or organization: None     Attends meetings of clubs or organizations: None     Relationship status: None    Intimate partner violence:     Fear of current or ex partner: None     Emotionally abused: None     Physically abused: None Forced sexual activity: None   Other Topics Concern    None   Social History Narrative    None         Allergies:  No Known Allergies      Medications:    Current Outpatient Medications:     ibuprofen (MOTRIN) 200 mg tablet, Take by mouth every 6 (six) hours as needed for mild pain, Disp: , Rfl:     Multiple Vitamin (MULTIVITAMIN) tablet, Take 1 tablet by mouth daily, Disp: , Rfl:     metoprolol succinate (TOPROL-XL) 50 mg 24 hr tablet, Take 1 tablet (50 mg total) by mouth daily, Disp: 90 tablet, Rfl: 1      The following portions of the patient's history were reviewed and updated as appropriate: past medical history, past surgical history, family history, social history, allergies, current medications and active problem list       Review of Systems:  Constitutional: Denies fever, chills, weight gain, weight loss, fatigue  Eyes: Denies eye redness, eye discharge, double vision, change in visual acuity  ENT: Denies hearing loss, tinnitus, sneezing, nasal congestion, nasal discharge, sore throat   Respiratory: Denies cough, expectoration, hemoptysis, shortness of breath, wheezing  Cardiovascular: Denies chest pain,   Has palpitations, denies  lower extremity swelling, orthopnea, PND  Gastrointestinal: Denies abdominal pain, heartburn, nausea, vomiting, hematemesis, diarrhea, bloody stools  Genito-Urinary: Denies dysuria, frequency, difficulty in micturition, nocturia, incontinence  Musculoskeletal: Denies back pain, joint pain, muscle pain  Neurologic: Denies confusion, lightheadedness, syncope, headache, focal weakness, sensory changes, seizures  Endocrine: Denies polyuria, polydipsia, temperature intolerance  Allergy and Immunology: Denies hives, insect bite sensitivity  Hematological and Lymphatic: Denies bleeding problems, swollen glands   Psychological: Denies depression, suicidal ideation, anxiety, panic, mood swings  Dermatological: Denies pruritus, rash, skin lesion changes      Vitals:  Vitals: 12/18/19 1559   BP: (!) 144/104   Pulse: (!) 138   SpO2: 98%       Body mass index is 21 01 kg/m²  Weight (last 2 days)     Date/Time   Weight    12/18/19 1559   58 6 (129 2)                Physical Examination:  General: Patient is not in acute distress  Awake, alert, responding to commands  No weight gain or loss  Head: Normocephalic  Atraumatic  Eyes: Conjunctiva and lids with no swelling, erythema or discharge  Both pupils normal sized, round and reactive to light  Sclera nonicteric  ENT: External examination of nose and ear normal  Otoscopic examination shows translucent tympanic membranes with patent canals without erythema  Oropharynx moist with no erythema, edema, exudate or lesions  Neck: Supple  JVP not raised  Trachea midline  No masses  No thyromegaly  Lungs: No signs of increased work of breathing or respiratory distress  Bilateral bronchovascular breath sounds with no crackles or rhonchi  Chest wall: No tenderness  Cardiovascular: Normal PMI  No thrills  Tachycardiac  Regular  rhythm  S1 and S2 normal  No murmur, rub or gallop  Gastrointestinal: Abdomen soft, nontender  No guarding or rigidity  Liver and spleen not palpable  Bowel sounds present  Neurologic: Cranial nerves II-XII intact   Cortical functions normal  Motor system - Reflexes 2+ and symmetrical  Sensations normal  Musculoskeletal: Gait normal  No joint tenderness  Integumentary: Skin normal with no rash or lesions  Lymphatic: No palpable lymph nodes in neck, axilla or groin  Extremities: No clubbing, cyanosis, edema or varicosities  Psychological: Judgement and insight normal  Mood and affect normal      Laboratory Results:  CBC with diff:   Lab Results   Component Value Date    WBC 5 63 12/18/2019    RBC 4 23 12/18/2019    HGB 12 8 12/18/2019    HCT 38 5 12/18/2019    MCV 91 12/18/2019    MCH 30 3 12/18/2019    RDW 12 7 12/18/2019     12/18/2019       CMP:  No results found for: CREATININE, BUN, NA, K, CL, CO2, GLUCOSE, PROT, ALKPHOS, ALT, AST, BILIDIR    No results found for: HGBA1C, MG, PHOS    No results found for: TROPONINI, CKMB, CKTOTAL    Lipid Profile:   No results found for: CHOL  No results found for: HDL  No results found for: LDLCALC  No results found for: TRIG    Imaging Results:  Echo complete with contrast if indicated  [facility], [address] [facility]  [phone]    Transthoracic Echocardiogram  2D, M-mode, and Color Doppler    Study date:  12-Dec-2019    Patient: Elvan Osler  MR number: [id number]  Account number: [de-identified]  : 1985  Age: 30 years  Gender: Female  Status: Outpatient  Location: [facility]  Height: 65 in  Weight: 125 lb  BP: 138/ 96 mmHg    Indications: Paroxysmal tachycardia  Diagnoses: I47 9 - Paroxysmal tachycardia, unspecified    Sonographer:  Garcia RCS  Primary Physician:  Julita David MD  Referring Physician:  Julita David MD  Group:  [facility]  Interpreting Physician:  Indigo Bolton MD    SUMMARY    LEFT VENTRICLE:  Systolic function was normal  Ejection fraction was estimated to be 55 %  There were no regional wall motion abnormalities  PULMONIC VALVE:  There was trace regurgitation  PROCEDURE: The study was performed in the [address] [place] [address]  This was a routine study  The transthoracic approach was used  The study included complete 2D imaging, M-mode, and color Doppler  The heart rate was 90 bpm, at the  start of the study  Images were obtained from the parasternal, apical, subcostal, and suprasternal notch acoustic windows  Image quality was adequate  LEFT VENTRICLE: Size was normal  Systolic function was normal  Ejection fraction was estimated to be 55 %  There were no regional wall motion abnormalities   Wall thickness was normal  DOPPLER: Left ventricular diastolic function parameters  were normal     RIGHT VENTRICLE: The size was normal  Systolic function was normal  Wall thickness was normal     LEFT ATRIUM: Size was normal     RIGHT ATRIUM: Size was normal     MITRAL VALVE: Valve structure was normal  There was normal leaflet separation  DOPPLER: The transmitral velocity was within the normal range  There was no evidence for stenosis  There was no significant regurgitation  AORTIC VALVE: The valve was trileaflet  Leaflets exhibited normal thickness and normal cuspal separation  DOPPLER: Transaortic velocity was within the normal range  There was no evidence for stenosis  There was no significant  regurgitation  TRICUSPID VALVE: The valve structure was normal  There was normal leaflet separation  DOPPLER: The transtricuspid velocity was within the normal range  There was no evidence for stenosis  There was no significant regurgitation  PULMONIC VALVE: Leaflets exhibited normal thickness, no calcification, and normal cuspal separation  DOPPLER: The transpulmonic velocity was within the normal range  There was trace regurgitation  PERICARDIUM: There was no pericardial effusion  The pericardium was normal in appearance  AORTA: The root exhibited normal size  SYSTEMIC VEINS: IVC: The inferior vena cava was normal in size      SYSTEM MEASUREMENT TABLES    2D  %FS: 26 42 %  Ao Diam: 2 5 cm  EDV(Teich): 74 1 ml  EF(Teich): 52 21 %  ESV(Teich): 35 41 ml  IVSd: 0 64 cm  LA Area: 10 85 cm2  LA Diam: 2 51 cm  LVEDV MOD A4C: 77 21 ml  LVEF MOD A4C: 54 53 %  LVESV MOD A4C: 35 1 ml  LVIDd: 4 1 cm  LVIDs: 3 01 cm  LVLd A4C: 8 33 cm  LVLs A4C: 6 37 cm  LVPWd: 0 73 cm  RA Area: 8 1 cm2  RVIDd: 2 45 cm  SV MOD A4C: 42 1 ml  SV(Teich): 38 69 ml    CW  AV MaxP 3 mmHg  AV Vmax: 1 15 m/s    MM  TAPSE: 1 72 cm    PW  E': 0 13 m/s  E/E': 7 49  LVOT Vmax: 0 86 m/s  LVOT maxP 96 mmHg  MV A Deric: 0 6 m/s  MV Dec [place]: 6 28 m/s2  MV DecT: 149 79 ms  MV E Deric: 0 94 m/s  MV E/A Ratio: 1 56  MV PHT: 43 44 ms  MVA By PHT: 5 06 cm2    [employer] Accredited Echocardiography Laboratory    Prepared and electronically signed by    Keenan Salgado MD  Signed 12-Dec-2019 16:17:25       Health Maintenance:  Health Maintenance   Topic Date Due    DTaP,Tdap,and Td Vaccines (1 - Tdap) 07/05/1998    HIV Screening  07/05/2002    Influenza Vaccine  07/01/2019    Depression Screening PHQ  12/04/2020    BMI: Adult  12/04/2020    Cervical Cancer Screening  09/26/2024    Pneumococcal Vaccine: 65+ Years (1 of 2 - PCV13) 07/05/2052    Pneumococcal Vaccine: Pediatrics (0 to 5 Years) and At-Risk Patients (6 to 59 Years)  Aged Out    HIB Vaccine  Aged Out    Hepatitis B Vaccine  Aged Out    IPV Vaccine  Aged Out    Hepatitis A Vaccine  Aged Out    Meningococcal ACWY Vaccine  Aged Out    HPV Vaccine  Aged Out       There is no immunization history on file for this patient        Wenceslao Middleton MD  12/18/2019,7:12 PM

## 2019-12-19 LAB
ALBUMIN SERPL BCP-MCNC: 4.7 G/DL (ref 3.5–5)
ALP SERPL-CCNC: 57 U/L (ref 46–116)
ALT SERPL W P-5'-P-CCNC: 18 U/L (ref 12–78)
ANION GAP SERPL CALCULATED.3IONS-SCNC: 5 MMOL/L (ref 4–13)
AST SERPL W P-5'-P-CCNC: 10 U/L (ref 5–45)
BILIRUB SERPL-MCNC: 0.45 MG/DL (ref 0.2–1)
BUN SERPL-MCNC: 7 MG/DL (ref 5–25)
CALCIUM SERPL-MCNC: 9.5 MG/DL (ref 8.3–10.1)
CHLORIDE SERPL-SCNC: 105 MMOL/L (ref 100–108)
CHOLEST SERPL-MCNC: 182 MG/DL (ref 50–200)
CO2 SERPL-SCNC: 29 MMOL/L (ref 21–32)
CREAT SERPL-MCNC: 0.84 MG/DL (ref 0.6–1.3)
GFR SERPL CREATININE-BSD FRML MDRD: 106 ML/MIN/1.73SQ M
GLUCOSE SERPL-MCNC: 81 MG/DL (ref 65–140)
HDLC SERPL-MCNC: 54 MG/DL
LDLC SERPL CALC-MCNC: 116 MG/DL (ref 0–100)
NONHDLC SERPL-MCNC: 128 MG/DL
POTASSIUM SERPL-SCNC: 4 MMOL/L (ref 3.5–5.3)
PROT SERPL-MCNC: 7.9 G/DL (ref 6.4–8.2)
SODIUM SERPL-SCNC: 139 MMOL/L (ref 136–145)
T3 SERPL-MCNC: 0.9 NG/ML (ref 0.6–1.8)
T4 SERPL-MCNC: 7 UG/DL (ref 4.7–13.3)
TRIGL SERPL-MCNC: 62 MG/DL
TSH SERPL DL<=0.05 MIU/L-ACNC: 2.33 UIU/ML (ref 0.36–3.74)

## 2020-01-03 ENCOUNTER — CONSULT (OUTPATIENT)
Dept: GASTROENTEROLOGY | Facility: CLINIC | Age: 35
End: 2020-01-03
Payer: COMMERCIAL

## 2020-01-03 VITALS
DIASTOLIC BLOOD PRESSURE: 82 MMHG | WEIGHT: 130 LBS | BODY MASS INDEX: 21.14 KG/M2 | HEART RATE: 79 BPM | SYSTOLIC BLOOD PRESSURE: 108 MMHG

## 2020-01-03 DIAGNOSIS — Z80.0 FAMILY HISTORY OF COLON CANCER: ICD-10-CM

## 2020-01-03 DIAGNOSIS — K62.5 RECTAL BLEEDING: Primary | ICD-10-CM

## 2020-01-03 DIAGNOSIS — K59.09 OTHER CONSTIPATION: ICD-10-CM

## 2020-01-03 PROCEDURE — 99244 OFF/OP CNSLTJ NEW/EST MOD 40: CPT | Performed by: INTERNAL MEDICINE

## 2020-01-03 NOTE — LETTER
January 3, 2020     Cm Worley MD  [address] [place] [address]    Patient: Fara Robin   YOB: 1985   Date of Visit: 1/3/2020       Dear Dr Tanisha Matos:    Thank you for referring Fara Robin to me for evaluation  Below are my notes for this consultation  If you have questions, please do not hesitate to call me  I look forward to following your patient along with you  Sincerely,        Thesapna Lentz MD        CC: No Recipients  Keith Lentz MD  1/3/2020  8:51 AM  Incomplete  [facility]    Dear Ally Pisano,     I had the pleasure of seeing your patient Fara Robin in the office today and I thank you for this kind referral        Chief Complaint:  Family history of colon cancer, rectal bleeding, constipation      HPI:  Fara Robin is a 30 y o  female who presents with concerned because of a family history of colon cancer  Her maternal grandfather was diagnosed in his early 76s  Maternal grandmother was diagnosed with breast cancer  On father's side both grandparents as well as an aunt and uncle had cancer of some kind  She is not really sure what  Patient has been having a mild intermittent midepigastric pain that occurs if she eats too much  Otherwise she is not bothered by this  However she does have some chronic constipation issues  More recently she has begun disease some bright red blood toilet paper toilet  She has no rectal lower abdominal pain  She does have an umbilical hernia which occasionally is mildly uncomfortable  She has no nausea or vomiting  No diarrhea  No fever or chills  Patient did have a 20 lb weight loss over several months but is now putting weight back on  There was no apparent reason for this  She has no other specific exacerbating or remitting factors  No other GI complaints at the present        Review of Systems:   Constitutional: No fever or chills, feels well, no tiredness, no recent weight gain or weight loss  HENT: No complaints of earache, no hearing loss, no nosebleeds, no nasal discharge, no sore throat, no hoarseness  Eyes: No complaints of eye pain, no red eyes, no discharge from eyes, no itchy eyes  Cardiovascular: No complaints of slow heart rate, no fast heart rate, no chest pain, no palpitations, no leg claudication, no lower extremity edema  Respiratory: No complaints of shortness of breath, no wheezing, no cough, no SOB on exertion, no orthopnea  Gastrointestinal: As noted in HPI  Genitourinary: No complaints of dysuria, no incontinence, no hesitancy, no nocturia  Musculoskeletal: No complaints of arthralgia, no myalgias, no joint swelling or stiffness, no limb pain or swelling  Neurological: No complaints of headache, no confusion, no convulsions, no numbness or tingling, no dizziness or fainting, no limb weakness, no difficulty walking  Skin: No complaints of skin rash or skin lesions, no itching, no skin wound, no dry skin  Hematological/Lymphatic: No complaints of swollen glands, does not bleed easy  Allergic/Immunologic: No immunocompromised state  Endocrine:  No complaints of polyuria, no polydipsia  Psychiatric/Behavioral: is not suicidal, no sleep disturbances, no anxiety or depression, no change in personality, no emotional problems         Historical Information   Past Medical History:   Diagnosis Date    Hypertension      Past Surgical History:   Procedure Laterality Date    HEEL SPUR EXCISION Right 2016    TONSILLECTOMY  1979    TUBAL LIGATION  10/01/2018     Social History   Social History     Substance and Sexual Activity   Alcohol Use Never    Frequency: Never     Social History     Substance and Sexual Activity   Drug Use Never     Social History     Tobacco Use   Smoking Status Never Smoker   Smokeless Tobacco Never Used     Family History   Problem Relation Age of Onset    Hypertension Mother     Hypertension Brother    [facility] Asthma Brother     Breast cancer Maternal Grandmother     Colon cancer Maternal Grandfather     Cancer Paternal Grandmother     Cancer Paternal Grandfather     Cancer Paternal Aunt     Cancer Paternal Uncle          Current Medications: has a current medication list which includes the following prescription(s): ibuprofen, metoprolol succinate, and multivitamin  Vital Signs: /82   Pulse 79   Wt 59 kg (130 lb)   BMI 21 14 kg/m²      Physical Exam:   Constitutional  General Appearance: No acute distress, well appearing and well nourished  Head  Normocephalic  Eyes  Conjunctivae and lids: No swelling, erythema, or discharge  Pupils and irises: Equal, round and reactive to light  Ears, Nose, Mouth, and Throat  External inspection of ears and nose: Normal  Nasal mucosa, septum and turbinates: Normal without edema or erythema/   Oropharynx: Normal with no erythema, edema, exudate or lesions  Neck  Normal range of motion  Neck supple  Cardiovascular  Auscultation of the heart: Normal rate and rhythm, normal S1 and S2 without murmurs  Examination of the extremities for edema and/or varicosities: Normal  Pulmonary/Chest  Respiratory effort: No increased work of breathing or signs of respiratory distress  Auscultation of lungs: Clear to auscultation, equal breath sounds bilaterally, no wheezes, rales, no rhonchi  Abdomen  Abdomen: Non-tender, no masses  Liver and spleen: No hepatomegaly or splenomegaly  Musculoskeletal  Gait and station: normal   Digits and Nails: normal without clubbing or cyanosis  Inspection/palpation of joints, bones, and muscles: Normal  Neurological  No nystagmus or asterixis  Skin  Skin and subcutaneous tissue: Normal without rashes or lesions  Lymphatic  Palpation of the lymph nodes in neck: No lymphadenopathy     Psychiatric  Orientation to person, place and time: Normal   Mood and affect: Normal          Labs:   Lab Results   Component Value Date    ALT 18 12/18/2019    AST 10 12/18/2019    BUN 7 12/18/2019    CALCIUM 9 5 12/18/2019     12/18/2019    CO2 29 12/18/2019    CREATININE 0 84 12/18/2019    HDL 54 12/18/2019    HCT 38 5 12/18/2019    HGB 12 8 12/18/2019     12/18/2019    K 4 0 12/18/2019    TRIG 62 12/18/2019    WBC 5 63 12/18/2019         X-Rays & Procedures:   No orders to display         ______________________________________________________________________      Assessment & Plan:      Diagnoses and all orders for this visit:    Rectal bleeding    Family history of colon cancer  -     Ambulatory referral to Gastroenterology    Other constipation        I have taken liberty of scheduling the patient for colonoscopy  I will be happy to inform you of her results and any further recommendations  I would like to thank you for allowing me to participate in her care              With warmest regards,    Lizz Castillo MD, [facility]

## 2020-01-03 NOTE — PROGRESS NOTES
[facility]    Dear Michael Villa,     I had the pleasure of seeing your patient Josemanuel Patricia in the office today and I thank you for this kind referral        Chief Complaint:  Family history of colon cancer, rectal bleeding, constipation      HPI:  Josemanuel Patricia is a 30 y o  female who presents with concerned because of a family history of colon cancer  Her maternal grandfather was diagnosed in his early 76s  Maternal grandmother was diagnosed with breast cancer  On father's side both grandparents as well as an aunt and uncle had cancer of some kind  She is not really sure what  Patient has been having a mild intermittent midepigastric pain that occurs if she eats too much  Otherwise she is not bothered by this  However she does have some chronic constipation issues  More recently she has begun disease some bright red blood toilet paper toilet  She has no rectal lower abdominal pain  She does have an umbilical hernia which occasionally is mildly uncomfortable  She has no nausea or vomiting  No diarrhea  No fever or chills  Patient did have a 20 lb weight loss over several months but is now putting weight back on  There was no apparent reason for this  She has no other specific exacerbating or remitting factors  No other GI complaints at the present  Review of Systems:   Constitutional: No fever or chills, feels well, no tiredness, no recent weight gain or weight loss  HENT: No complaints of earache, no hearing loss, no nosebleeds, no nasal discharge, no sore throat, no hoarseness  Eyes: No complaints of eye pain, no red eyes, no discharge from eyes, no itchy eyes  Cardiovascular: No complaints of slow heart rate, no fast heart rate, no chest pain, no palpitations, no leg claudication, no lower extremity edema  Respiratory: No complaints of shortness of breath, no wheezing, no cough, no SOB on exertion, no orthopnea     Gastrointestinal: As noted in HPI  Genitourinary: No complaints of dysuria, no incontinence, no hesitancy, no nocturia  Musculoskeletal: No complaints of arthralgia, no myalgias, no joint swelling or stiffness, no limb pain or swelling  Neurological: No complaints of headache, no confusion, no convulsions, no numbness or tingling, no dizziness or fainting, no limb weakness, no difficulty walking  Skin: No complaints of skin rash or skin lesions, no itching, no skin wound, no dry skin  Hematological/Lymphatic: No complaints of swollen glands, does not bleed easy  Allergic/Immunologic: No immunocompromised state  Endocrine:  No complaints of polyuria, no polydipsia  Psychiatric/Behavioral: is not suicidal, no sleep disturbances, no anxiety or depression, no change in personality, no emotional problems  Historical Information   Past Medical History:   Diagnosis Date    Hypertension      Past Surgical History:   Procedure Laterality Date    HEEL SPUR EXCISION Right 2016    TONSILLECTOMY  1979    TUBAL LIGATION  10/01/2018     Social History   Social History     Substance and Sexual Activity   Alcohol Use Never    Frequency: Never     Social History     Substance and Sexual Activity   Drug Use Never     Social History     Tobacco Use   Smoking Status Never Smoker   Smokeless Tobacco Never Used     Family History   Problem Relation Age of Onset    Hypertension Mother     Hypertension Brother     Asthma Brother     Breast cancer Maternal Grandmother     Colon cancer Maternal Grandfather     Cancer Paternal Grandmother     Cancer Paternal Grandfather     Cancer Paternal Aunt     Cancer Paternal Uncle          Current Medications: has a current medication list which includes the following prescription(s): ibuprofen, metoprolol succinate, and multivitamin         Vital Signs: /82   Pulse 79   Wt 59 kg (130 lb)   BMI 21 14 kg/m²     Physical Exam:   Constitutional  General Appearance: No acute distress, well appearing and well nourished  Head  Normocephalic  Eyes  Conjunctivae and lids: No swelling, erythema, or discharge  Pupils and irises: Equal, round and reactive to light  Ears, Nose, Mouth, and Throat  External inspection of ears and nose: Normal  Nasal mucosa, septum and turbinates: Normal without edema or erythema/   Oropharynx: Normal with no erythema, edema, exudate or lesions  Neck  Normal range of motion  Neck supple  Cardiovascular  Auscultation of the heart: Normal rate and rhythm, normal S1 and S2 without murmurs  Examination of the extremities for edema and/or varicosities: Normal  Pulmonary/Chest  Respiratory effort: No increased work of breathing or signs of respiratory distress  Auscultation of lungs: Clear to auscultation, equal breath sounds bilaterally, no wheezes, rales, no rhonchi  Abdomen  Abdomen: Non-tender, no masses  Liver and spleen: No hepatomegaly or splenomegaly  Musculoskeletal  Gait and station: normal   Digits and Nails: normal without clubbing or cyanosis  Inspection/palpation of joints, bones, and muscles: Normal  Neurological  No nystagmus or asterixis  Skin  Skin and subcutaneous tissue: Normal without rashes or lesions  Lymphatic  Palpation of the lymph nodes in neck: No lymphadenopathy     Psychiatric  Orientation to person, place and time: Normal   Mood and affect: Normal          Labs:   Lab Results   Component Value Date    ALT 18 12/18/2019    AST 10 12/18/2019    BUN 7 12/18/2019    CALCIUM 9 5 12/18/2019     12/18/2019    CO2 29 12/18/2019    CREATININE 0 84 12/18/2019    HDL 54 12/18/2019    HCT 38 5 12/18/2019    HGB 12 8 12/18/2019     12/18/2019    K 4 0 12/18/2019    TRIG 62 12/18/2019    WBC 5 63 12/18/2019         X-Rays & Procedures:   No orders to display         ______________________________________________________________________      Assessment & Plan:      Diagnoses and all orders for this visit:    Rectal bleeding    Family history of colon cancer  -     Ambulatory referral to Gastroenterology    Other constipation        I have taken liberty of scheduling the patient for colonoscopy  I will be happy to inform you of her results and any further recommendations  I would like to thank you for allowing me to participate in her care              With warmest regards,    Kelvin Gannon MD, [facility]

## 2020-01-03 NOTE — H&P (VIEW-ONLY)
[facility]    Dear Anila Rich,     I had the pleasure of seeing your patient Johnnie Juan in the office today and I thank you for this kind referral        Chief Complaint:  Family history of colon cancer, rectal bleeding, constipation      HPI:  Johnnie Juan is a 30 y o  female who presents with concerned because of a family history of colon cancer  Her maternal grandfather was diagnosed in his early 76s  Maternal grandmother was diagnosed with breast cancer  On father's side both grandparents as well as an aunt and uncle had cancer of some kind  She is not really sure what  Patient has been having a mild intermittent midepigastric pain that occurs if she eats too much  Otherwise she is not bothered by this  However she does have some chronic constipation issues  More recently she has begun disease some bright red blood toilet paper toilet  She has no rectal lower abdominal pain  She does have an umbilical hernia which occasionally is mildly uncomfortable  She has no nausea or vomiting  No diarrhea  No fever or chills  Patient did have a 20 lb weight loss over several months but is now putting weight back on  There was no apparent reason for this  She has no other specific exacerbating or remitting factors  No other GI complaints at the present  Review of Systems:   Constitutional: No fever or chills, feels well, no tiredness, no recent weight gain or weight loss  HENT: No complaints of earache, no hearing loss, no nosebleeds, no nasal discharge, no sore throat, no hoarseness  Eyes: No complaints of eye pain, no red eyes, no discharge from eyes, no itchy eyes  Cardiovascular: No complaints of slow heart rate, no fast heart rate, no chest pain, no palpitations, no leg claudication, no lower extremity edema  Respiratory: No complaints of shortness of breath, no wheezing, no cough, no SOB on exertion, no orthopnea     Gastrointestinal: As noted in HPI  Genitourinary: No complaints of dysuria, no incontinence, no hesitancy, no nocturia  Musculoskeletal: No complaints of arthralgia, no myalgias, no joint swelling or stiffness, no limb pain or swelling  Neurological: No complaints of headache, no confusion, no convulsions, no numbness or tingling, no dizziness or fainting, no limb weakness, no difficulty walking  Skin: No complaints of skin rash or skin lesions, no itching, no skin wound, no dry skin  Hematological/Lymphatic: No complaints of swollen glands, does not bleed easy  Allergic/Immunologic: No immunocompromised state  Endocrine:  No complaints of polyuria, no polydipsia  Psychiatric/Behavioral: is not suicidal, no sleep disturbances, no anxiety or depression, no change in personality, no emotional problems  Historical Information   Past Medical History:   Diagnosis Date    Hypertension      Past Surgical History:   Procedure Laterality Date    HEEL SPUR EXCISION Right 2016    TONSILLECTOMY  1979    TUBAL LIGATION  10/01/2018     Social History   Social History     Substance and Sexual Activity   Alcohol Use Never    Frequency: Never     Social History     Substance and Sexual Activity   Drug Use Never     Social History     Tobacco Use   Smoking Status Never Smoker   Smokeless Tobacco Never Used     Family History   Problem Relation Age of Onset    Hypertension Mother     Hypertension Brother     Asthma Brother     Breast cancer Maternal Grandmother     Colon cancer Maternal Grandfather     Cancer Paternal Grandmother     Cancer Paternal Grandfather     Cancer Paternal Aunt     Cancer Paternal Uncle          Current Medications: has a current medication list which includes the following prescription(s): ibuprofen, metoprolol succinate, and multivitamin         Vital Signs: /82   Pulse 79   Wt 59 kg (130 lb)   BMI 21 14 kg/m²     Physical Exam:   Constitutional  General Appearance: No acute distress, well appearing and well nourished  Head  Normocephalic  Eyes  Conjunctivae and lids: No swelling, erythema, or discharge  Pupils and irises: Equal, round and reactive to light  Ears, Nose, Mouth, and Throat  External inspection of ears and nose: Normal  Nasal mucosa, septum and turbinates: Normal without edema or erythema/   Oropharynx: Normal with no erythema, edema, exudate or lesions  Neck  Normal range of motion  Neck supple  Cardiovascular  Auscultation of the heart: Normal rate and rhythm, normal S1 and S2 without murmurs  Examination of the extremities for edema and/or varicosities: Normal  Pulmonary/Chest  Respiratory effort: No increased work of breathing or signs of respiratory distress  Auscultation of lungs: Clear to auscultation, equal breath sounds bilaterally, no wheezes, rales, no rhonchi  Abdomen  Abdomen: Non-tender, no masses  Liver and spleen: No hepatomegaly or splenomegaly  Musculoskeletal  Gait and station: normal   Digits and Nails: normal without clubbing or cyanosis  Inspection/palpation of joints, bones, and muscles: Normal  Neurological  No nystagmus or asterixis  Skin  Skin and subcutaneous tissue: Normal without rashes or lesions  Lymphatic  Palpation of the lymph nodes in neck: No lymphadenopathy     Psychiatric  Orientation to person, place and time: Normal   Mood and affect: Normal          Labs:   Lab Results   Component Value Date    ALT 18 12/18/2019    AST 10 12/18/2019    BUN 7 12/18/2019    CALCIUM 9 5 12/18/2019     12/18/2019    CO2 29 12/18/2019    CREATININE 0 84 12/18/2019    HDL 54 12/18/2019    HCT 38 5 12/18/2019    HGB 12 8 12/18/2019     12/18/2019    K 4 0 12/18/2019    TRIG 62 12/18/2019    WBC 5 63 12/18/2019         X-Rays & Procedures:   No orders to display         ______________________________________________________________________      Assessment & Plan:      Diagnoses and all orders for this visit:    Rectal bleeding    Family history of colon cancer  -     Ambulatory referral to Gastroenterology    Other constipation        I have taken liberty of scheduling the patient for colonoscopy  I will be happy to inform you of her results and any further recommendations  I would like to thank you for allowing me to participate in her care              With warmest regards,    Markus Gomez MD, [facility]

## 2020-01-07 ENCOUNTER — TELEPHONE (OUTPATIENT)
Dept: GASTROENTEROLOGY | Facility: CLINIC | Age: 35
End: 2020-01-07

## 2020-01-15 ENCOUNTER — ANESTHESIA EVENT (OUTPATIENT)
Dept: GASTROENTEROLOGY | Facility: HOSPITAL | Age: 35
End: 2020-01-15

## 2020-01-15 ENCOUNTER — HOSPITAL ENCOUNTER (OUTPATIENT)
Dept: GASTROENTEROLOGY | Facility: HOSPITAL | Age: 35
Setting detail: OUTPATIENT SURGERY
Discharge: HOME | End: 2020-01-15
Attending: INTERNAL MEDICINE
Payer: COMMERCIAL

## 2020-01-15 ENCOUNTER — ANESTHESIA (OUTPATIENT)
Dept: GASTROENTEROLOGY | Facility: HOSPITAL | Age: 35
End: 2020-01-15

## 2020-01-15 VITALS
HEIGHT: 65 IN | WEIGHT: 127.21 LBS | HEART RATE: 70 BPM | RESPIRATION RATE: 12 BRPM | DIASTOLIC BLOOD PRESSURE: 80 MMHG | SYSTOLIC BLOOD PRESSURE: 118 MMHG | TEMPERATURE: 98.4 F | BODY MASS INDEX: 21.19 KG/M2 | OXYGEN SATURATION: 100 %

## 2020-01-15 DIAGNOSIS — K59.09 OTHER CONSTIPATION: ICD-10-CM

## 2020-01-15 DIAGNOSIS — K62.5 RECTAL BLEEDING: ICD-10-CM

## 2020-01-15 DIAGNOSIS — Z80.0 FAMILY HISTORY OF COLON CANCER: ICD-10-CM

## 2020-01-15 LAB
EXT PREGNANCY TEST URINE: NEGATIVE
EXT. CONTROL: NORMAL

## 2020-01-15 PROCEDURE — 81025 URINE PREGNANCY TEST: CPT | Performed by: ANESTHESIOLOGY

## 2020-01-15 PROCEDURE — 88305 TISSUE EXAM BY PATHOLOGIST: CPT | Performed by: PATHOLOGY

## 2020-01-15 PROCEDURE — 45385 COLONOSCOPY W/LESION REMOVAL: CPT | Performed by: INTERNAL MEDICINE

## 2020-01-15 RX ORDER — SODIUM CHLORIDE, SODIUM LACTATE, POTASSIUM CHLORIDE, CALCIUM CHLORIDE 600; 310; 30; 20 MG/100ML; MG/100ML; MG/100ML; MG/100ML
125 INJECTION, SOLUTION INTRAVENOUS CONTINUOUS
Status: DISCONTINUED | OUTPATIENT
Start: 2020-01-15 | End: 2020-01-19 | Stop reason: HOSPADM

## 2020-01-15 RX ORDER — SODIUM CHLORIDE, SODIUM LACTATE, POTASSIUM CHLORIDE, CALCIUM CHLORIDE 600; 310; 30; 20 MG/100ML; MG/100ML; MG/100ML; MG/100ML
20 INJECTION, SOLUTION INTRAVENOUS CONTINUOUS
Status: CANCELLED | OUTPATIENT
Start: 2020-01-15

## 2020-01-15 RX ORDER — PROPOFOL 10 MG/ML
INJECTION, EMULSION INTRAVENOUS AS NEEDED
Status: DISCONTINUED | OUTPATIENT
Start: 2020-01-15 | End: 2020-01-15 | Stop reason: SURG

## 2020-01-15 RX ORDER — LIDOCAINE HYDROCHLORIDE 10 MG/ML
INJECTION, SOLUTION EPIDURAL; INFILTRATION; INTRACAUDAL; PERINEURAL AS NEEDED
Status: DISCONTINUED | OUTPATIENT
Start: 2020-01-15 | End: 2020-01-15 | Stop reason: SURG

## 2020-01-15 RX ADMIN — PROPOFOL 50 MG: 10 INJECTION, EMULSION INTRAVENOUS at 08:17

## 2020-01-15 RX ADMIN — PROPOFOL 50 MG: 10 INJECTION, EMULSION INTRAVENOUS at 08:21

## 2020-01-15 RX ADMIN — PROPOFOL 50 MG: 10 INJECTION, EMULSION INTRAVENOUS at 08:24

## 2020-01-15 RX ADMIN — PROPOFOL 50 MG: 10 INJECTION, EMULSION INTRAVENOUS at 08:28

## 2020-01-15 RX ADMIN — LIDOCAINE HYDROCHLORIDE 50 MG: 10 INJECTION, SOLUTION EPIDURAL; INFILTRATION; INTRACAUDAL; PERINEURAL at 08:13

## 2020-01-15 RX ADMIN — PROPOFOL 150 MG: 10 INJECTION, EMULSION INTRAVENOUS at 08:13

## 2020-01-15 RX ADMIN — SODIUM CHLORIDE, SODIUM LACTATE, POTASSIUM CHLORIDE, AND CALCIUM CHLORIDE 125 ML/HR: .6; .31; .03; .02 INJECTION, SOLUTION INTRAVENOUS at 07:51

## 2020-01-15 NOTE — ANESTHESIA PREPROCEDURE EVALUATION
Review of Systems/Medical History  Patient summary reviewed  Chart reviewed      Cardiovascular  Exercise tolerance (METS): >4,  Hypertension controlled,    Pulmonary       GI/Hepatic            Endo/Other     GYN  Not currently pregnant ,          Hematology   Musculoskeletal       Neurology   Psychology           Physical Exam    Airway    Mallampati score: I         Dental   No notable dental hx     Cardiovascular  Rhythm: regular, Rate: normal,     Pulmonary      Other Findings        Anesthesia Plan  ASA Score- 2     Anesthesia Type- IV sedation with anesthesia with ASA Monitors  Additional Monitors:   Airway Plan:         Plan Factors-Patient not instructed to abstain from smoking on day of procedure  Patient did not smoke on day of surgery  Induction- intravenous  Postoperative Plan-     Informed Consent- Anesthetic plan and risks discussed with patient  I personally reviewed this patient with the CRNA  Discussed and agreed on the Anesthesia Plan with the CRNA  Lyle Wilhelm

## 2020-01-15 NOTE — DISCHARGE INSTRUCTIONS
Colonoscopy   WHAT YOU NEED TO KNOW:   A colonoscopy is a procedure to examine the inside of your colon (intestine) with a scope  Polyps or tissue growths may have been removed during your colonoscopy  It is normal to feel bloated and to have some abdominal discomfort  You should be passing gas  If you have hemorrhoids or you had polyps removed, you may have a small amount of bleeding  DISCHARGE INSTRUCTIONS:   Seek care immediately if:   · You have a large amount of bright red blood in your bowel movements  · Your abdomen is hard and firm and you have severe pain  · You have sudden trouble breathing  Contact your healthcare provider if:   · You develop a rash or hives  · You have a fever within 24 hours of your procedure       · You have not had a bowel movement for 3 days after your procedure  · You have questions or concerns about your condition or care  Activity:   · Do not lift, strain, or run  for 3 days after your procedure  · Rest after your procedure  You have been given medicine to relax you  Do not  drive or make important decisions until the day after your procedure  Return to your normal activity as directed  · Relieve gas and discomfort from bloating  by lying on your right side with a heating pad on your abdomen  You may need to take short walks to help the gas move out  Eat small meals until bloating is relieved  If you had polyps removed: For 7 days after your procedure:  · Do not  take aspirin  · Do not  go on long car rides  Follow up with your healthcare provider as directed:  Write down your questions so you remember to ask them during your visits  © 2017 [address] is for End User's use only and may not be sold, redistributed or otherwise used for commercial purposes  All illustrations and images included in CareNotes® are the copyrighted property of [employer]  or [employer]    The above information is an  only  It is not intended as medical advice for individual conditions or treatments  Talk to your doctor, nurse or pharmacist before following any medical regimen to see if it is safe and effective for you

## 2020-01-15 NOTE — ANESTHESIA POSTPROCEDURE EVALUATION
Post-Op Assessment Note    CV Status:  Stable  Pain Score: 0    Pain management: adequate     Mental Status:  Somnolent   Hydration Status:  Euvolemic   PONV Controlled:  Controlled   Airway Patency:  Patent   Post Op Vitals Reviewed: Yes      Staff: CRNA           BP 97/56 (01/15/20 0834)    Temp 98 4 °F (36 9 °C) (01/15/20 0834)    Pulse 83 (01/15/20 0834)   Resp 18 (01/15/20 0834)    SpO2 97 % (01/15/20 0834)

## 2020-01-15 NOTE — INTERVAL H&P NOTE
H&P reviewed  After examining the patient I find no changes in the patients condition since the H&P had been written      Vitals:    01/15/20 0733   BP: 115/74   Pulse: 82   Resp: 18   Temp: 98 7 °F (37 1 °C)   SpO2: 99%

## 2020-01-28 ENCOUNTER — TELEPHONE (OUTPATIENT)
Dept: GASTROENTEROLOGY | Facility: CLINIC | Age: 35
End: 2020-01-28

## 2020-02-25 ENCOUNTER — OFFICE VISIT (OUTPATIENT)
Dept: SURGERY | Facility: CLINIC | Age: 35
End: 2020-02-25
Payer: COMMERCIAL

## 2020-02-25 VITALS
TEMPERATURE: 98.9 F | DIASTOLIC BLOOD PRESSURE: 64 MMHG | HEIGHT: 65 IN | BODY MASS INDEX: 21.83 KG/M2 | SYSTOLIC BLOOD PRESSURE: 104 MMHG | WEIGHT: 131 LBS | HEART RATE: 85 BPM

## 2020-02-25 DIAGNOSIS — M62.08 DIASTASIS RECTI: Primary | ICD-10-CM

## 2020-02-25 DIAGNOSIS — K42.9 UMBILICAL HERNIA WITHOUT OBSTRUCTION AND WITHOUT GANGRENE: ICD-10-CM

## 2020-02-25 PROCEDURE — 99243 OFF/OP CNSLTJ NEW/EST LOW 30: CPT | Performed by: SURGERY

## 2020-02-25 NOTE — PROGRESS NOTES
Consult- General Surgery   Josemanuel Patricia 30 y o  female MRN: [id number]  Unit/Bed#:  Encounter: [id number]    Assessment/Plan     Assessment:  Diastasis recti associated with a small umbilical hernia  Plan:  No surgical intervention is needed at this time the patient has diastasis recti with a small space of 1 5 cm and a small umbilical hernia  The patient is in complete agreement  History of Present Illness     HPI:  Josemanuel Patricia is a 30 y o  female who presents to my office for evaluation of umbilical hernia  The patient noted a bulge from the umbilicus all the way after to her breast bone since delivery of her twins back in September 2019  She denies having any pain, nausea, vomiting, diarrhea, constipation or any other constitutional symptoms  The patient went to see her primary care physician and she was referred to us for surgical evaluation  Review of Systems   Constitutional: Negative for chills and fever  HENT: Negative for nosebleeds and sore throat  Eyes: Negative for pain and discharge  Respiratory: Negative for cough and shortness of breath  Cardiovascular: Negative for chest pain and palpitations  Gastrointestinal:        As per history of present illness  Endocrine: Negative for cold intolerance and heat intolerance  Genitourinary: Negative for dysuria and hematuria  Neurological: Negative for seizures and headaches  Hematological: Negative for adenopathy  Does not bruise/bleed easily  Psychiatric/Behavioral: Negative for confusion  The patient is not nervous/anxious          Historical Information   Past Medical History:   Diagnosis Date    Hypertension      Past Surgical History:   Procedure Laterality Date    HEEL SPUR EXCISION Right 2016    TONSILLECTOMY  1979    TUBAL LIGATION  10/01/2018     Social History   Social History     Substance and Sexual Activity   Alcohol Use Never    Frequency: Never     Social History     Substance and Sexual Activity Drug Use Never     Social History     Tobacco Use   Smoking Status Never Smoker   Smokeless Tobacco Never Used     Family History: non-contributory    Meds/Allergies   all medications and allergies reviewed     Current Outpatient Medications:     ibuprofen (MOTRIN) 200 mg tablet, Take by mouth every 6 (six) hours as needed for mild pain, Disp: , Rfl:     metoprolol succinate (TOPROL-XL) 50 mg 24 hr tablet, Take 1 tablet (50 mg total) by mouth daily, Disp: 90 tablet, Rfl: 1    Multiple Vitamin (MULTIVITAMIN) tablet, Take 1 tablet by mouth daily, Disp: , Rfl:   No Known Allergies    Objective     Current Vitals:   Blood Pressure: 104/64 (02/25/20 0835)  Pulse: 85 (02/25/20 0835)  Temperature: 98 9 °F (37 2 °C) (02/25/20 0835)  Temp Source: Oral (02/25/20 0835)  Height: 5' 5" (165 1 cm) (02/25/20 0835)  Weight - Scale: 59 4 kg (131 lb) (02/25/20 0835)      Invasive Devices     Peripheral Intravenous Line            Peripheral IV 01/15/20 Right Hand 41 days                Physical Exam   Constitutional: She is oriented to person, place, and time  She appears well-developed and well-nourished  No distress  HENT:   Head: Normocephalic  Mouth/Throat: No oropharyngeal exudate  Eyes: Pupils are equal, round, and reactive to light  No scleral icterus  Cardiovascular: Normal rate and regular rhythm  No murmur heard  Pulmonary/Chest: Effort normal and breath sounds normal  No respiratory distress  Abdominal:   Abdomen is soft, nondistended and nontender  There is a small umbilical hernia of approximately 1 cm  There is also diastasis recti on the patient flexes the abdominal wall muscle and the separation is approximately 1 5 cm  There is no visceromegaly or mass palpable  Neurological: She is alert and oriented to person, place, and time  No cranial nerve deficit  Skin: No rash noted  No erythema  Psychiatric: She has a normal mood and affect   Her behavior is normal    Nursing note and vitals reviewed

## 2020-02-25 NOTE — PROGRESS NOTES
Office Visit - General Surgery  Kike Schmitt MRN: [id number]  Encounter: [id number]    Assessment and Plan    Problem List Items Addressed This Visit     None          Chief Complaint:  Kike Schmitt is a 30 y o  female who presents for Consult (Umbilical hernia repair)    Subjective      Past Medical History  Past Medical History:   Diagnosis Date    Hypertension        Past Surgical History  Past Surgical History:   Procedure Laterality Date    HEEL SPUR EXCISION Right 2016    TONSILLECTOMY  1979    TUBAL LIGATION  10/01/2018       Family History  Family History   Problem Relation Age of Onset    Hypertension Mother     Hypertension Brother     Asthma Brother     Breast cancer Maternal Grandmother     Colon cancer Maternal Grandfather     Cancer Paternal Grandmother     Cancer Paternal Grandfather     Cancer Paternal Aunt     Cancer Paternal Uncle        Social History  Social History     Socioeconomic History    Marital status: Legally      Spouse name: None    Number of children: None    Years of education: None    Highest education level: None   Occupational History    None   Social Needs    Financial resource strain: None    Food insecurity:     Worry: None     Inability: None    Transportation needs:     Medical: None     Non-medical: None   Tobacco Use    Smoking status: Never Smoker    Smokeless tobacco: Never Used   Substance and Sexual Activity    Alcohol use: Never     Frequency: Never    Drug use: Never    Sexual activity: Not Currently     Partners: Male   Lifestyle    Physical activity:     Days per week: 0 days     Minutes per session: 0 min    Stress: None   Relationships    Social connections:     Talks on phone: None     Gets together: None     Attends Yazdanism service: None     Active member of club or organization: None     Attends meetings of clubs or organizations: None     Relationship status: None    Intimate partner violence:     Fear of current or ex partner: None     Emotionally abused: None     Physically abused: None     Forced sexual activity: None   Other Topics Concern    None   Social History Narrative    None        Medications  Current Outpatient Medications on File Prior to Visit   Medication Sig Dispense Refill    ibuprofen (MOTRIN) 200 mg tablet Take by mouth every 6 (six) hours as needed for mild pain      metoprolol succinate (TOPROL-XL) 50 mg 24 hr tablet Take 1 tablet (50 mg total) by mouth daily 90 tablet 1    Multiple Vitamin (MULTIVITAMIN) tablet Take 1 tablet by mouth daily       No current facility-administered medications on file prior to visit          Allergies  No Known Allergies    Review of Systems    Objective  Vitals:    02/25/20 0835   BP: 104/64   Pulse: 85   Temp: 98 9 °F (37 2 °C)       Physical Exam

## 2020-05-11 DIAGNOSIS — I10 ESSENTIAL HYPERTENSION: ICD-10-CM

## 2020-05-12 RX ORDER — METOPROLOL SUCCINATE 50 MG/1
50 TABLET, EXTENDED RELEASE ORAL DAILY
Qty: 90 TABLET | Refills: 0 | Status: SHIPPED | OUTPATIENT
Start: 2020-05-12 | End: 2020-06-22

## 2020-06-22 DIAGNOSIS — I10 ESSENTIAL HYPERTENSION: ICD-10-CM

## 2020-06-22 RX ORDER — METOPROLOL SUCCINATE 50 MG/1
TABLET, EXTENDED RELEASE ORAL
Qty: 90 TABLET | Refills: 0 | Status: SHIPPED | OUTPATIENT
Start: 2020-06-22 | End: 2020-09-23

## 2020-08-26 ENCOUNTER — OFFICE VISIT (OUTPATIENT)
Dept: INTERNAL MEDICINE CLINIC | Facility: CLINIC | Age: 35
End: 2020-08-26
Payer: COMMERCIAL

## 2020-08-26 VITALS
BODY MASS INDEX: 23.13 KG/M2 | DIASTOLIC BLOOD PRESSURE: 84 MMHG | HEIGHT: 65 IN | HEART RATE: 97 BPM | TEMPERATURE: 97.8 F | WEIGHT: 138.8 LBS | SYSTOLIC BLOOD PRESSURE: 126 MMHG | OXYGEN SATURATION: 99 %

## 2020-08-26 DIAGNOSIS — I47.9 PAROXYSMAL TACHYCARDIA (HCC): ICD-10-CM

## 2020-08-26 DIAGNOSIS — I10 ESSENTIAL HYPERTENSION: ICD-10-CM

## 2020-08-26 DIAGNOSIS — R07.9 CHEST PAIN, UNSPECIFIED TYPE: Primary | ICD-10-CM

## 2020-08-26 PROCEDURE — 93000 ELECTROCARDIOGRAM COMPLETE: CPT | Performed by: INTERNAL MEDICINE

## 2020-08-26 PROCEDURE — 1036F TOBACCO NON-USER: CPT | Performed by: INTERNAL MEDICINE

## 2020-08-26 PROCEDURE — 3008F BODY MASS INDEX DOCD: CPT | Performed by: INTERNAL MEDICINE

## 2020-08-26 PROCEDURE — 3074F SYST BP LT 130 MM HG: CPT | Performed by: INTERNAL MEDICINE

## 2020-08-26 PROCEDURE — 3079F DIAST BP 80-89 MM HG: CPT | Performed by: INTERNAL MEDICINE

## 2020-08-26 PROCEDURE — 3725F SCREEN DEPRESSION PERFORMED: CPT | Performed by: INTERNAL MEDICINE

## 2020-08-26 PROCEDURE — 99214 OFFICE O/P EST MOD 30 MIN: CPT | Performed by: INTERNAL MEDICINE

## 2020-08-26 NOTE — PROGRESS NOTES
INTERNAL MEDICINE OFFICE VISIT  [facility] AT [facility] [address], [address] [facility]  Tel: [phone]      NAME: Marla Matos  AGE: 37 y o  SEX: female  : 1985   MRN: [id number]    DATE: 2020  TIME: 5:12 PM      Assessment and Plan:  1  Chest pain, unspecified type   patient had chest pain in the left side of her chest about 3 days ago  She did not go to the ER at that time  She also had a similar pain about a year ago when she was in [place] and she did go to see the cardiologist where an echo was done which was within normal limits  She never had a stress test done  EKG was done in the office and there were some inverted T-waves noted  - POCT ECG  - Ambulatory referral to Cardiology; Future    2  Essential hypertension    Blood pressure well controlled, continue the same medication    3  Paroxysmal tachycardia ([address] )   stable      - Counseling Documentation: patient was counseled regarding: diagnostic results, instructions for management, risk factor reductions, prognosis, patient and family education, risks and benefits of treatment options and importance of compliance with treatment  - Medication Side Effects: Adverse side effects of medications were reviewed with the patient/guardian today  Return for follow up visit in  As needed or earlier, if needed  Chief Complaint:  Chief Complaint   Patient presents with    Physical Exam    Shoulder Pain     left    Chest Pain         History of Present Illness:    chest pain - she had an episode of chest pain about 3 days ago when she was going to the store with her son and getting ready for it  The pain was on the left side and it stayed for about 10-15 minutes  It did not radiate anywhere  It was about 6 on 10 in severity  Hypertension -very well controlled on the metoprolol    The heart rate is also better  Paroxysmal tachycardia -better since she was started on the metoprolol      Active Problem List:  Patient Active Problem List   Diagnosis    Essential hypertension    Family history of colon cancer    Chronic idiopathic constipation    Paroxysmal tachycardia ([address] )         Past Medical History:  Past Medical History:   Diagnosis Date    Hypertension          Past Surgical History:  Past Surgical History:   Procedure Laterality Date    HEEL SPUR EXCISION Right 2016    TONSILLECTOMY  1979    TUBAL LIGATION  10/01/2018         Family History:  Family History   Problem Relation Age of Onset    Hypertension Mother     Hypertension Brother     Asthma Brother     Breast cancer Maternal Grandmother     Colon cancer Maternal Grandfather     Cancer Paternal Grandmother     Cancer Paternal Grandfather     Cancer Paternal Aunt     Cancer Paternal Uncle          Social History:  Social History     Socioeconomic History    Marital status: Legally      Spouse name: None    Number of children: None    Years of education: None    Highest education level: None   Occupational History    None   Social Needs    Financial resource strain: None    Food insecurity     Worry: None     Inability: None    Transportation needs     Medical: None     Non-medical: None   Tobacco Use    Smoking status: Never Smoker    Smokeless tobacco: Never Used   Substance and Sexual Activity    Alcohol use: Never     Frequency: Never    Drug use: Never    Sexual activity: Not Currently     Partners: Male   Lifestyle    Physical activity     Days per week: 0 days     Minutes per session: 0 min    Stress: Not at all   Relationships    Social connections     Talks on phone: None     Gets together: None     Attends Yazidism service: None     Active member of club or organization: None     Attends meetings of clubs or organizations: None     Relationship status: None    Intimate partner violence     Fear of current or ex partner: None     Emotionally abused: None     Physically abused: None     Forced sexual activity: None   Other Topics Concern    None   Social History Narrative    None         Allergies:  No Known Allergies      Medications:    Current Outpatient Medications:     ibuprofen (MOTRIN) 200 mg tablet, Take by mouth every 6 (six) hours as needed for mild pain, Disp: , Rfl:     metoprolol succinate (TOPROL-XL) 50 mg 24 hr tablet, TAKE 1 TABLET(50 MG) BY MOUTH DAILY, Disp: 90 tablet, Rfl: 0    Multiple Vitamin (MULTIVITAMIN) tablet, Take 1 tablet by mouth daily, Disp: , Rfl:       The following portions of the patient's history were reviewed and updated as appropriate: past medical history, past surgical history, family history, social history, allergies, current medications and active problem list       Review of Systems:  Constitutional: Denies fever, chills, weight gain, weight loss, fatigue  Eyes: Denies eye redness, eye discharge, double vision, change in visual acuity  ENT: Denies hearing loss, tinnitus, sneezing, nasal congestion, nasal discharge, sore throat   Respiratory: Denies cough, expectoration, hemoptysis, shortness of breath, wheezing  Cardiovascular:  Had an episode of chest pain, denies palpitations, lower extremity swelling, orthopnea, PND  Gastrointestinal: Denies abdominal pain, heartburn, nausea, vomiting, hematemesis, diarrhea, bloody stools  Genito-Urinary: Denies dysuria, frequency, difficulty in micturition, nocturia, incontinence  Musculoskeletal: Denies back pain, joint pain, muscle pain  Neurologic: Denies confusion, lightheadedness, syncope, headache, focal weakness, sensory changes, seizures  Endocrine: Denies polyuria, polydipsia, temperature intolerance  Allergy and Immunology: Denies hives, insect bite sensitivity  Hematological and Lymphatic: Denies bleeding problems, swollen glands   Psychological: Denies depression, suicidal ideation, anxiety, panic, mood swings  Dermatological: Denies pruritus, rash, skin lesion changes      Vitals:  Vitals:    08/26/20 1650   BP: 126/84   Pulse: 97   Temp: 97 8 °F (36 6 °C)   SpO2: 99%       Body mass index is 23 1 kg/m²  Weight (last 2 days)     Date/Time   Weight    08/26/20 1650   63 (138 8)                Physical Examination:  General: Patient is not in acute distress  Awake, alert, responding to commands  No weight gain or loss  Head: Normocephalic  Atraumatic  Eyes: Conjunctiva and lids with no swelling, erythema or discharge  Both pupils normal sized, round and reactive to light  Sclera nonicteric  ENT: External examination of nose and ear normal  Otoscopic examination shows translucent tympanic membranes with patent canals without erythema  Oropharynx moist with no erythema, edema, exudate or lesions  Neck: Supple  JVP not raised  Trachea midline  No masses  No thyromegaly  Lungs: No signs of increased work of breathing or respiratory distress  Bilateral bronchovascular breath sounds with no crackles or rhonchi  Chest wall: No tenderness  Cardiovascular: Normal PMI  No thrills  Regular rate and rhythm  S1 and S2 normal  No murmur, rub or gallop  Gastrointestinal: Abdomen soft, nontender  No guarding or rigidity  Liver and spleen not palpable  Bowel sounds present  Neurologic: Cranial nerves II-XII intact   Cortical functions normal  Motor system - Reflexes 2+ and symmetrical  Sensations normal  Musculoskeletal: Gait normal  No joint tenderness  Integumentary: Skin normal with no rash or lesions  Lymphatic: No palpable lymph nodes in neck, axilla or groin  Extremities: No clubbing, cyanosis, edema or varicosities  Psychological: Judgement and insight normal  Mood and affect normal      Laboratory Results:  CBC with diff:   Lab Results   Component Value Date    WBC 5 63 12/18/2019    RBC 4 23 12/18/2019    HGB 12 8 12/18/2019    HCT 38 5 12/18/2019    MCV 91 12/18/2019    MCH 30 3 12/18/2019    RDW 12 7 12/18/2019     12/18/2019       CMP:  Lab Results   Component Value Date    CREATININE 0 84 12/18/2019    BUN 7 12/18/2019    K 4 0 12/18/2019     12/18/2019    CO2 29 12/18/2019    ALKPHOS 57 12/18/2019    ALT 18 12/18/2019    AST 10 12/18/2019       No results found for: HGBA1C, MG, PHOS    No results found for: TROPONINI, CKMB, CKTOTAL    Lipid Profile:   No results found for: CHOL  Lab Results   Component Value Date    HDL 54 12/18/2019     Lab Results   Component Value Date    LDLCALC 116 (H) 12/18/2019     Lab Results   Component Value Date    TRIG 62 12/18/2019       Imaging Results:  Colonoscopy  Narrative:  [facility]  [facility]  [phone]    DATE OF SERVICE:  1/15/20    PHYSICIAN(S):   Yoly Ricardo - Attending Physician        INDICATION:   Rectal bleeding, Other constipation, family history of colon cancer in a   second-degree relative  Colonoscopy performed for a diagnostic indication  POST-OP DIAGNOSIS:  See the impression below  HISTORY:  Prior colonoscopy: No prior colonoscopy  History question Answer Diagnosis Date Comment      Colon polyps No Colon polyp 1/15/2020       Colon cancer No Colon cancer ([facility] [address] ) 1/15/2020         PREPROCEDURE:  Informed consent was obtained for the procedure, including sedation  Risks   including but not limited to bleeding, infection, perforation, adverse   drug reaction and aspiration were explained in detail  Also explained   about less than 100% sensitivity with the exam and other alternatives  The   patient was placed in the left lateral decubitus position  DETAILS OF PROCEDURE:  The patient underwent deep sedation, which was administered by an   anesthesia professional  The patient's blood pressure, heart rate, level   of consciousness, oxygen and respirations were monitored throughout the   procedure  A digital rectal exam wasperformed  The scope was introduced   through the anus and advanced to the terminal ileum   Photodocumentation   was obtained at the ileocecal valve, appendiceal orifice and terminal   ileum  Retroflexion was performed in the rectum  The quality of   bowelpreparation was evaluated using the Dhruv Bowel Preparation Scale   with scores of: right colon = 2, transverse colon = 2, left colon = 2  The   total score was 6  Bowel prep was adequate  The patient experienced no   blood loss  The procedure was notdifficult  The patient tolerated the   procedure well  There were no apparent complications  ANESTHESIA INFORMATION:  ASA: II  Anesthesia Type: IV Sedation with Anesthesia    FINDINGS:  One sessile polyp measuring smaller than 5 mm in the rectum; removed en   bloc by cold snare and retrieved specimen  Otherwise normal colon, normal   ileum, normal retroversion    EVENTS:  Procedure Events   Event Event Time   ENDO CECUM REACHED 1/15/2020  8:21 AM   ENDO SCOPE OUT TIME 1/15/2020  8:29 AM     SPECIMENS:  ID Type Source Tests Collected by Time Destination   1 : rectum Tissue Polyp, Colorectal TISSUE EXAM Nicholas Daley MD   1/15/2020  8:26 AM         Impression: 1   Rectal polyp, status post cold snare polypectomy     RECOMMENDATION:  Repeat in 5 years due to a personal history of colon polyps  Follow up biopsy results in 2 weeks       Health Maintenance:  Health Maintenance   Topic Date Due    HIV Screening  07/05/2002    Annual Physical  07/05/2005    DTaP,Tdap,and Td Vaccines (1 - Tdap) 07/05/2008    Influenza Vaccine  07/01/2020    Depression Screening PHQ  08/26/2021    BMI: Adult  08/26/2021    Cervical Cancer Screening  09/26/2024    Colonoscopy Surveillance  01/15/2025    Pneumococcal Vaccine: Pediatrics (0 to 5 Years) and At-Risk Patients (6 to 59 Years)  Aged Out    HIB Vaccine  Aged Out    Hepatitis B Vaccine  Aged Out    IPV Vaccine  Aged Out    Hepatitis A Vaccine  Aged Out    Meningococcal ACWY Vaccine  Aged Out    HPV Vaccine  Aged Out       There is no immunization history on file for this patient        Lilliana Rodas MD  8/26/2020,5:12 PM

## 2020-09-23 DIAGNOSIS — I10 ESSENTIAL HYPERTENSION: ICD-10-CM

## 2020-09-23 RX ORDER — METOPROLOL SUCCINATE 50 MG/1
TABLET, EXTENDED RELEASE ORAL
Qty: 90 TABLET | Refills: 0 | Status: SHIPPED | OUTPATIENT
Start: 2020-09-23 | End: 2020-12-23 | Stop reason: SDUPTHER

## 2020-10-05 ENCOUNTER — PATIENT MESSAGE (OUTPATIENT)
Dept: INTERNAL MEDICINE | Facility: CLINIC | Age: 35
End: 2020-10-05

## 2020-12-15 ENCOUNTER — OFFICE VISIT (OUTPATIENT)
Dept: GASTROENTEROLOGY | Facility: CLINIC | Age: 35
End: 2020-12-15
Payer: COMMERCIAL

## 2020-12-15 VITALS
DIASTOLIC BLOOD PRESSURE: 80 MMHG | SYSTOLIC BLOOD PRESSURE: 132 MMHG | BODY MASS INDEX: 23.32 KG/M2 | HEART RATE: 85 BPM | WEIGHT: 140 LBS | HEIGHT: 65 IN | RESPIRATION RATE: 16 BRPM

## 2020-12-15 DIAGNOSIS — K58.1 IRRITABLE BOWEL SYNDROME WITH CONSTIPATION: Primary | ICD-10-CM

## 2020-12-15 DIAGNOSIS — K21.9 GASTROESOPHAGEAL REFLUX DISEASE, UNSPECIFIED WHETHER ESOPHAGITIS PRESENT: ICD-10-CM

## 2020-12-15 DIAGNOSIS — R13.10 DYSPHAGIA, UNSPECIFIED TYPE: ICD-10-CM

## 2020-12-15 DIAGNOSIS — R93.3 ABNORMAL CT SCAN, ESOPHAGUS: ICD-10-CM

## 2020-12-15 PROCEDURE — 99214 OFFICE O/P EST MOD 30 MIN: CPT | Performed by: PHYSICIAN ASSISTANT

## 2020-12-15 PROCEDURE — 3075F SYST BP GE 130 - 139MM HG: CPT | Performed by: PHYSICIAN ASSISTANT

## 2020-12-15 PROCEDURE — 1036F TOBACCO NON-USER: CPT | Performed by: PHYSICIAN ASSISTANT

## 2020-12-15 PROCEDURE — 3079F DIAST BP 80-89 MM HG: CPT | Performed by: PHYSICIAN ASSISTANT

## 2020-12-15 PROCEDURE — 3008F BODY MASS INDEX DOCD: CPT | Performed by: PHYSICIAN ASSISTANT

## 2020-12-15 RX ORDER — PANTOPRAZOLE SODIUM 40 MG/1
40 TABLET, DELAYED RELEASE ORAL DAILY
COMMUNITY
Start: 2020-11-27 | End: 2022-03-11 | Stop reason: ALTCHOICE

## 2020-12-15 NOTE — H&P (VIEW-ONLY)
[facility] - Outpatient Follow-up Note  Lady Proctor 37 y o  female MRN: [id number]  Encounter: [id number]          ASSESSMENT AND PLAN:      1  Gastroesophageal reflux disease  2  Dysphagia  3  Abnormal CT scan, esophagus    Patient reports struggling with reflux symptoms and intermittent dysphagia x months  She recently had a CT Scan A/P which suggested circumferential thickening of the esophagus  She was started on Pantoprazole 40mg po daily and is reporting some improvement thus far  EGD to investigate for esophagitis, ulcers, bx for EoE and h pylori  Continue the Pantoprazole 40mg po daily x 8 weeks  GERD modifications discussed: avoidance of trigger foods and waiting 3 hours after eating before reclining/going to bed  4  Irritable bowel syndrome with constipation    She has a history of chronic constipation with intermittent abdominal discomfort  Recent CT Scan A/P showed a moderate amount of stool in the colon suggestive of constipation  She had a normal TSH level in August   Colonoscopy in January of this year revealed a small hyperplastic polyp  Recommend a fiber supplement and Miralax daily (this has worked for her in the past)  If the above regimen is insufficient, patient was instructed to call and would begin Linzess  ______________________________________________________________________    SUBJECTIVE:  Patient is a pleasant 31-year-old female who presents to the office for follow-up  Patient reports that she was previously in the ER through [facility] back in November due to significant right lower quadrant pain  The patient reports she was significantly constipated at that time and after having a bowel movement her pain was alleviated  She has a long history of intermittent constipation with abdominal discomfort and bloating  She has responded to fiber supplementation and MiraLax in the past when she takes it    She reports she can go up to several days without a bowel movement and is not currently on a regimen to regulate her bowel movements  She also reports of struggling with reflux symptoms for a period of months now  She reports waking up in the middle the night with reflux and coughing  She also reports intermittent dysphagia for solids  When she was in the ER she had a CT scan of the abdomen and pelvis which showed a moderate amount of stool in the colon suggestive of constipation and circumferential thickening of the esophagus  She had a colonoscopy in January of this year which revealed a small hyperplastic polyp  She has never had an EGD  She was started on pantoprazole recently and is reporting improvement in her reflux symptoms  She denies any blood in the stool  She denies any unintentional weight loss  REVIEW OF SYSTEMS IS OTHERWISE NEGATIVE        Historical Information   Past Medical History:   Diagnosis Date    Hypertension      Past Surgical History:   Procedure Laterality Date    HEEL SPUR EXCISION Right 2016    TONSILLECTOMY  1979    TUBAL LIGATION  10/01/2018     Social History   Social History     Substance and Sexual Activity   Alcohol Use Never    Frequency: Never     Social History     Substance and Sexual Activity   Drug Use Never     Social History     Tobacco Use   Smoking Status Never Smoker   Smokeless Tobacco Never Used     Family History   Problem Relation Age of Onset    Hypertension Mother     Hypertension Brother     Asthma Brother     Breast cancer Maternal Grandmother     Colon cancer Maternal Grandfather     Cancer Paternal Grandmother     Cancer Paternal Grandfather     Cancer Paternal Aunt     Cancer Paternal Uncle        Meds/Allergies       Current Outpatient Medications:     ibuprofen (MOTRIN) 200 mg tablet    metoprolol succinate (TOPROL-XL) 50 mg 24 hr tablet    Multiple Vitamin (MULTIVITAMIN) tablet    pantoprazole (PROTONIX) 40 mg tablet    No Known Allergies        Objective     Blood pressure 132/80, pulse 85, resp  rate 16, height 5' 5" (1 651 m), weight 63 5 kg (140 lb), not currently breastfeeding  Body mass index is 23 3 kg/m²  PHYSICAL EXAM:      General Appearance:   Alert, cooperative, no distress   HEENT:   Normocephalic, atraumatic, anicteric    Neck:  Supple, symmetrical, trachea midline   Lungs:   Clear to auscultation bilaterally; no rales, rhonchi or wheezing; respirations unlabored    Heart[de-identified]   Regular rate and rhythm; no murmur, rub, or gallop  Abdomen:   Soft, non-tender, non-distended; normal bowel sounds; no masses, no organomegaly    Genitalia:   Deferred    Rectal:   Deferred    Extremities:  No cyanosis, clubbing or edema    Pulses:  2+ and symmetric    Skin:  No jaundice, rashes, or lesions    Lymph nodes:  No palpable cervical lymphadenopathy        Lab Results:   No visits with results within 1 Day(s) from this visit  Latest known visit with results is:   Hospital Outpatient Visit on 01/15/2020   Component Date Value    EXT Preg Test, Ur 01/15/2020 Negative     Control 01/15/2020 Valid     Case Report 01/15/2020                      Value:Surgical Pathology Report                         Case: [id number]                                   Authorizing Provider:  Roosevelt Montes De Oca MD      Collected:           01/15/2020 0826              Ordering Location:      [facility]       Received:            01/15/2020 [address] [place] [address] Ext Endoscopy                                                             Pathologist:           Jodi Lewis MD                                                         Specimen:    Polyp, Colorectal, rectum                                                                  Final Diagnosis 01/15/2020                      Value: This result contains rich text formatting which cannot be displayed here      Additional Information 01/15/2020 Value:This result contains rich text formatting which cannot be displayed here  [employer] Description 01/15/2020                      Value: This result contains rich text formatting which cannot be displayed here   Clinical Information 01/15/2020                      Value:Cold snare polypectomy         Radiology Results:   No results found

## 2020-12-23 DIAGNOSIS — I10 ESSENTIAL HYPERTENSION: ICD-10-CM

## 2020-12-24 RX ORDER — METOPROLOL SUCCINATE 50 MG/1
50 TABLET, EXTENDED RELEASE ORAL DAILY
Qty: 90 TABLET | Refills: 0 | Status: SHIPPED | OUTPATIENT
Start: 2020-12-24 | End: 2021-04-01

## 2020-12-29 ENCOUNTER — ANESTHESIA EVENT (OUTPATIENT)
Dept: PERIOP | Facility: HOSPITAL | Age: 35
End: 2020-12-29

## 2020-12-30 ENCOUNTER — HOSPITAL ENCOUNTER (OUTPATIENT)
Dept: PERIOP | Facility: HOSPITAL | Age: 35
Setting detail: OUTPATIENT SURGERY
Discharge: HOME | End: 2020-12-30
Attending: INTERNAL MEDICINE | Admitting: INTERNAL MEDICINE
Payer: COMMERCIAL

## 2020-12-30 ENCOUNTER — ANESTHESIA (OUTPATIENT)
Dept: PERIOP | Facility: HOSPITAL | Age: 35
End: 2020-12-30

## 2020-12-30 VITALS — HEART RATE: 101 BPM

## 2020-12-30 VITALS
OXYGEN SATURATION: 100 % | SYSTOLIC BLOOD PRESSURE: 137 MMHG | HEIGHT: 65 IN | BODY MASS INDEX: 23.8 KG/M2 | HEART RATE: 67 BPM | DIASTOLIC BLOOD PRESSURE: 81 MMHG | WEIGHT: 142.86 LBS | TEMPERATURE: 97.7 F | RESPIRATION RATE: 16 BRPM

## 2020-12-30 DIAGNOSIS — K21.9 GASTROESOPHAGEAL REFLUX DISEASE, UNSPECIFIED WHETHER ESOPHAGITIS PRESENT: ICD-10-CM

## 2020-12-30 DIAGNOSIS — Z80.0 FAMILY HISTORY OF COLON CANCER: ICD-10-CM

## 2020-12-30 DIAGNOSIS — K59.09 OTHER CONSTIPATION: ICD-10-CM

## 2020-12-30 DIAGNOSIS — K58.1 IRRITABLE BOWEL SYNDROME WITH CONSTIPATION: ICD-10-CM

## 2020-12-30 DIAGNOSIS — K62.5 RECTAL BLEEDING: ICD-10-CM

## 2020-12-30 DIAGNOSIS — R93.3 ABNORMAL CT SCAN, ESOPHAGUS: ICD-10-CM

## 2020-12-30 DIAGNOSIS — R13.10 DYSPHAGIA, UNSPECIFIED TYPE: ICD-10-CM

## 2020-12-30 LAB
EXT PREGNANCY TEST URINE: NEGATIVE
EXT. CONTROL: NORMAL

## 2020-12-30 PROCEDURE — 88305 TISSUE EXAM BY PATHOLOGIST: CPT | Performed by: PATHOLOGY

## 2020-12-30 PROCEDURE — 81025 URINE PREGNANCY TEST: CPT | Performed by: INTERNAL MEDICINE

## 2020-12-30 PROCEDURE — 43248 EGD GUIDE WIRE INSERTION: CPT | Performed by: INTERNAL MEDICINE

## 2020-12-30 PROCEDURE — 43239 EGD BIOPSY SINGLE/MULTIPLE: CPT | Performed by: INTERNAL MEDICINE

## 2020-12-30 RX ORDER — SODIUM CHLORIDE, SODIUM LACTATE, POTASSIUM CHLORIDE, CALCIUM CHLORIDE 600; 310; 30; 20 MG/100ML; MG/100ML; MG/100ML; MG/100ML
125 INJECTION, SOLUTION INTRAVENOUS CONTINUOUS
Status: DISCONTINUED | OUTPATIENT
Start: 2020-12-30 | End: 2021-01-03 | Stop reason: HOSPADM

## 2020-12-30 RX ORDER — GLYCOPYRROLATE 0.2 MG/ML
INJECTION INTRAMUSCULAR; INTRAVENOUS AS NEEDED
Status: DISCONTINUED | OUTPATIENT
Start: 2020-12-30 | End: 2020-12-30

## 2020-12-30 RX ORDER — PROPOFOL 10 MG/ML
INJECTION, EMULSION INTRAVENOUS AS NEEDED
Status: DISCONTINUED | OUTPATIENT
Start: 2020-12-30 | End: 2020-12-30

## 2020-12-30 RX ORDER — SODIUM CHLORIDE, SODIUM LACTATE, POTASSIUM CHLORIDE, CALCIUM CHLORIDE 600; 310; 30; 20 MG/100ML; MG/100ML; MG/100ML; MG/100ML
INJECTION, SOLUTION INTRAVENOUS CONTINUOUS PRN
Status: DISCONTINUED | OUTPATIENT
Start: 2020-12-30 | End: 2020-12-30

## 2020-12-30 RX ADMIN — PROPOFOL 50 MG: 10 INJECTION, EMULSION INTRAVENOUS at 09:52

## 2020-12-30 RX ADMIN — GLYCOPYRROLATE 0.1 MG: 0.2 INJECTION, SOLUTION INTRAMUSCULAR; INTRAVENOUS at 09:48

## 2020-12-30 RX ADMIN — SODIUM CHLORIDE, SODIUM LACTATE, POTASSIUM CHLORIDE, AND CALCIUM CHLORIDE: .6; .31; .03; .02 INJECTION, SOLUTION INTRAVENOUS at 09:19

## 2020-12-30 RX ADMIN — PROPOFOL 150 MG: 10 INJECTION, EMULSION INTRAVENOUS at 09:48

## 2020-12-30 NOTE — DISCHARGE INSTRUCTIONS
Upper Endoscopy   WHAT YOU NEED TO KNOW:   An upper endoscopy is also called an upper gastrointestinal (GI) endoscopy, or an esophagogastroduodenoscopy (EGD)  You may feel bloated, gassy, or have some abdominal discomfort after your procedure  Your throat may be sore for 24 to 36 hours  You may burp or pass gas from air that is still inside your body  DISCHARGE INSTRUCTIONS:   Call 911 for any of the following:   · You have sudden chest pain or trouble breathing  Seek care immediately if:   · You feel dizzy or faint  · You have trouble swallowing  · Your bowel movements are very dark or black  · Your abdomen is hard and firm and you have severe pain  · You vomit blood  Contact your healthcare provider if:   · You feel full or bloated and cannot burp or pass gas  · You have not had a bowel movement for 3 days after your procedure  · You have neck pain  · You have a fever or chills  · You have nausea or are vomiting  · You have a rash or hives  · You have questions or concerns about your endoscopy  Relieve a sore throat:  Suck on throat lozenges or crushed ice  Gargle with a small amount of warm salt water  Mix 1 teaspoon of salt and 1 cup of warm water to make salt water  Relieve gas and discomfort from bloating:  Lie on your right side with a heating pad on your abdomen  Take short walks to help pass gas  Eat small meals until bloating is relieved  Rest after your procedure: You have been given medicine to relax you  Do not  drive or make important decisions until the day after your procedure  Return to your normal activity as directed  You can usually return to work the day after your procedure  Follow up with your healthcare provider as directed:  Write down your questions so you remember to ask them during your visits  © 2017 [employer] is for End User's use only and may not be sold, redistributed or otherwise used for commercial purposes  All illustrations and images included in CareNotes® are the copyrighted property of A D A M , Inc  or Esau Hanna  The above information is an  only  It is not intended as medical advice for individual conditions or treatments  Talk to your doctor, nurse or pharmacist before following any medical regimen to see if it is safe and effective for you

## 2020-12-30 NOTE — INTERVAL H&P NOTE
H&P reviewed  After examining the patient I find no changes in the patients condition since the H&P had been written      Vitals:    12/30/20 0905   BP: 126/80   Pulse: 80   Resp: 16   Temp: 98 4 °F (36 9 °C)   SpO2: 99%

## 2021-01-04 ENCOUNTER — TELEPHONE (OUTPATIENT)
Dept: GASTROENTEROLOGY | Facility: CLINIC | Age: 36
End: 2021-01-04

## 2021-01-04 ENCOUNTER — PATIENT MESSAGE (OUTPATIENT)
Dept: ADMINISTRATIVE | Facility: HOSPITAL | Age: 36
End: 2021-01-04

## 2021-01-04 NOTE — TELEPHONE ENCOUNTER
FYI: Spoke with patient  History of GERD, dysphagia, IBS-C    Patient c/o eye twitching and pain after EGD  Reassured patient propofol is gone within 24 hours, and if her eye twitching continues to follow-up with her PCP

## 2021-01-07 ENCOUNTER — TELEPHONE (OUTPATIENT)
Dept: GASTROENTEROLOGY | Facility: CLINIC | Age: 36
End: 2021-01-07

## 2021-01-15 ENCOUNTER — TELEPHONE (OUTPATIENT)
Dept: GASTROENTEROLOGY | Facility: CLINIC | Age: 36
End: 2021-01-15

## 2021-01-15 DIAGNOSIS — K59.00 CONSTIPATION, UNSPECIFIED CONSTIPATION TYPE: Primary | ICD-10-CM

## 2021-01-15 RX ORDER — LINACLOTIDE 145 UG/1
145 CAPSULE, GELATIN COATED ORAL EVERY MORNING
Qty: 30 CAPSULE | Refills: 5 | Status: SHIPPED | OUTPATIENT
Start: 2021-01-15 | End: 2022-03-11 | Stop reason: ALTCHOICE

## 2021-01-15 NOTE — TELEPHONE ENCOUNTER
----- Message from Delvin Yao sent at 1/14/2021  4:45 PM EST -----  Regarding: Prescription Question  Contact: [phone]  Hello,    I am interested in trying Linzess for the constipation

## 2021-01-15 NOTE — TELEPHONE ENCOUNTER
Call her in a script for Linzess 145 micro g 1 p o  Daily dispense 30 with 5 refills and let her know to call me in 2 weeks with her progress

## 2021-02-20 ENCOUNTER — PATIENT MESSAGE (OUTPATIENT)
Dept: GASTROENTEROLOGY | Facility: CLINIC | Age: 36
End: 2021-02-20

## 2021-04-01 DIAGNOSIS — I10 ESSENTIAL HYPERTENSION: ICD-10-CM

## 2021-04-01 RX ORDER — METOPROLOL SUCCINATE 50 MG/1
TABLET, EXTENDED RELEASE ORAL
Qty: 90 TABLET | Refills: 0 | Status: SHIPPED | OUTPATIENT
Start: 2021-04-01 | End: 2021-06-28

## 2021-04-05 ENCOUNTER — PATIENT MESSAGE (OUTPATIENT)
Dept: ADMINISTRATIVE | Facility: HOSPITAL | Age: 36
End: 2021-04-05

## 2021-07-06 ENCOUNTER — PATIENT MESSAGE (OUTPATIENT)
Dept: ADMINISTRATIVE | Facility: HOSPITAL | Age: 36
End: 2021-07-06

## 2021-09-30 DIAGNOSIS — I10 ESSENTIAL HYPERTENSION: ICD-10-CM

## 2021-09-30 RX ORDER — METOPROLOL SUCCINATE 50 MG/1
TABLET, EXTENDED RELEASE ORAL
Qty: 90 TABLET | Refills: 0 | Status: SHIPPED | OUTPATIENT
Start: 2021-09-30 | End: 2022-03-11 | Stop reason: ALTCHOICE

## 2021-10-04 ENCOUNTER — PATIENT MESSAGE (OUTPATIENT)
Dept: FAMILY MEDICINE | Facility: CLINIC | Age: 36
End: 2021-10-04

## 2022-01-10 ENCOUNTER — PATIENT MESSAGE (OUTPATIENT)
Dept: ADMINISTRATIVE | Facility: HOSPITAL | Age: 37
End: 2022-01-10
Payer: COMMERCIAL

## 2022-03-11 ENCOUNTER — OFFICE VISIT (OUTPATIENT)
Dept: INTERNAL MEDICINE CLINIC | Facility: CLINIC | Age: 37
End: 2022-03-11
Payer: COMMERCIAL

## 2022-03-11 ENCOUNTER — APPOINTMENT (OUTPATIENT)
Dept: LAB | Facility: CLINIC | Age: 37
End: 2022-03-11
Payer: COMMERCIAL

## 2022-03-11 VITALS
DIASTOLIC BLOOD PRESSURE: 76 MMHG | BODY MASS INDEX: 21.63 KG/M2 | OXYGEN SATURATION: 98 % | SYSTOLIC BLOOD PRESSURE: 128 MMHG | HEART RATE: 84 BPM | TEMPERATURE: 98.5 F | WEIGHT: 134.6 LBS | RESPIRATION RATE: 16 BRPM | HEIGHT: 66 IN

## 2022-03-11 DIAGNOSIS — Z28.21 INFLUENZA VACCINATION DECLINED: ICD-10-CM

## 2022-03-11 DIAGNOSIS — Z28.21 TETANUS, DIPHTHERIA, AND ACELLULAR PERTUSSIS (TDAP) VACCINATION DECLINED: ICD-10-CM

## 2022-03-11 DIAGNOSIS — Z00.00 ANNUAL PHYSICAL EXAM: ICD-10-CM

## 2022-03-11 DIAGNOSIS — Z28.21 COVID-19 VACCINATION DECLINED: ICD-10-CM

## 2022-03-11 DIAGNOSIS — Z00.00 ANNUAL PHYSICAL EXAM: Primary | ICD-10-CM

## 2022-03-11 LAB
ERYTHROCYTE [DISTWIDTH] IN BLOOD BY AUTOMATED COUNT: 12.9 % (ref 11.6–15.1)
HCT VFR BLD AUTO: 36.3 % (ref 34.8–46.1)
HGB BLD-MCNC: 11.8 G/DL (ref 11.5–15.4)
MCH RBC QN AUTO: 28.9 PG (ref 26.8–34.3)
MCHC RBC AUTO-ENTMCNC: 32.5 G/DL (ref 31.4–37.4)
MCV RBC AUTO: 89 FL (ref 82–98)
PLATELET # BLD AUTO: 354 THOUSANDS/UL (ref 149–390)
PMV BLD AUTO: 10.2 FL (ref 8.9–12.7)
RBC # BLD AUTO: 4.09 MILLION/UL (ref 3.81–5.12)
WBC # BLD AUTO: 3.76 THOUSAND/UL (ref 4.31–10.16)

## 2022-03-11 PROCEDURE — 99395 PREV VISIT EST AGE 18-39: CPT | Performed by: NURSE PRACTITIONER

## 2022-03-11 PROCEDURE — 36415 COLL VENOUS BLD VENIPUNCTURE: CPT

## 2022-03-11 PROCEDURE — 1036F TOBACCO NON-USER: CPT | Performed by: NURSE PRACTITIONER

## 2022-03-11 PROCEDURE — 3725F SCREEN DEPRESSION PERFORMED: CPT | Performed by: NURSE PRACTITIONER

## 2022-03-11 PROCEDURE — 80061 LIPID PANEL: CPT

## 2022-03-11 PROCEDURE — 85027 COMPLETE CBC AUTOMATED: CPT

## 2022-03-11 PROCEDURE — 3008F BODY MASS INDEX DOCD: CPT | Performed by: NURSE PRACTITIONER

## 2022-03-11 PROCEDURE — 80048 BASIC METABOLIC PNL TOTAL CA: CPT

## 2022-03-11 NOTE — PROGRESS NOTES
ADULT ANNUAL PHYSICAL  [address] [facility]    NAME: Ina Castillo  AGE: 31 y o  SEX: female  : 1985     DATE: 3/11/2022     Assessment and Plan:     Problem List Items Addressed This Visit        Other    COVID-19 vaccination declined    Tetanus, diphtheria, and acellular pertussis (Tdap) vaccination declined    Influenza vaccination declined      Other Visit Diagnoses     Annual physical exam    -  Primary    Relevant Orders    Lipid panel    CBC and Platelet    Basic metabolic panel          Immunizations and preventive care screenings were discussed with patient today  Appropriate education was printed on patient's after visit summary  Counseling:  Alcohol/drug use: discussed moderation in alcohol intake, the recommendations for healthy alcohol use, and avoidance of illicit drug use  Dental Health: discussed importance of regular tooth brushing, flossing, and dental visits  Injury prevention: discussed safety/seat belts, safety helmets, smoke detectors, carbon dioxide detectors, and smoking near bedding or upholstery  Sexual health: discussed sexually transmitted diseases, partner selection, use of condoms, avoidance of unintended pregnancy, and contraceptive alternatives  · Exercise: the importance of regular exercise/physical activity was discussed  Recommend exercise 3-5 times per week for at least 30 minutes  Depression Screening and Follow-up Plan: Patient was screened for depression during today's encounter  They screened negative with a PHQ-2 score of 0  Return in about 1 year (around 3/11/2023) for yearly physical exam      Chief Complaint:     Chief Complaint   Patient presents with    Annual Exam      History of Present Illness:     Adult Annual Physical   Patient here for a comprehensive physical exam  The patient reports no problems      Diet and Physical Activity  · Diet/Nutrition: well balanced diet, limited junk food and consuming 3-5 servings of fruits/vegetables daily  · Exercise: walking and 3-4 times a week on average  Depression Screening  PHQ-2/9 Depression Screening    Little interest or pleasure in doing things: 0 - not at all  Feeling down, depressed, or hopeless: 0 - not at all  PHQ-2 Score: 0  PHQ-2 Interpretation: Negative depression screen       General Health  · Sleep: gets 4-6 hours of sleep on average  · Hearing: normal - bilateral   · Vision: no vision problems and most recent eye exam >1 year ago  · Dental: regular dental visits and brushes teeth twice daily  /GYN Health  · Last menstrual period: [id number]  · Contraceptive method: none, tubal ligation  · History of STDs?: no      Review of Systems:     Review of Systems   Constitutional: Negative for activity change, fatigue and fever  HENT: Negative for congestion, hearing loss, rhinorrhea, trouble swallowing and voice change  Eyes: Negative for photophobia, pain, discharge and visual disturbance  Respiratory: Negative for cough, chest tightness and shortness of breath  Cardiovascular: Negative for chest pain, palpitations and leg swelling  Gastrointestinal: Negative for abdominal pain, blood in stool, constipation, nausea and vomiting  Endocrine: Negative for cold intolerance and heat intolerance  Genitourinary: Negative for difficulty urinating, frequency, hematuria, urgency, vaginal bleeding and vaginal discharge  Musculoskeletal: Negative for arthralgias and myalgias  Skin: Negative  Neurological: Negative for dizziness, weakness, numbness and headaches  Psychiatric/Behavioral: Negative for decreased concentration  The patient is not nervous/anxious         Past Medical History:     Past Medical History:   Diagnosis Date    Hypertension       Past Surgical History:     Past Surgical History:   Procedure Laterality Date    HEEL SPUR EXCISION Right 2016    TONSILLECTOMY  1979    TUBAL LIGATION  10/01/2018 Social History:     Social History     Socioeconomic History    Marital status: Legally      Spouse name: None    Number of children: None    Years of education: None    Highest education level: None   Occupational History    None   Tobacco Use    Smoking status: Never Smoker    Smokeless tobacco: Never Used   Vaping Use    Vaping Use: Never used   Substance and Sexual Activity    Alcohol use: Yes     Comment: occasionally    Drug use: Never    Sexual activity: Not Currently     Partners: Male   Other Topics Concern    None   Social History Narrative    None     Social Determinants of Health     Financial Resource Strain: Not on file   Food Insecurity: Not on file   Transportation Needs: Not on file   Physical Activity: Not on file   Stress: Not on file   Social Connections: Not on file   Intimate Partner Violence: Not on file   Housing Stability: Not on file      Family History:     Family History   Problem Relation Age of Onset    Hypertension Mother     Hypertension Brother     Asthma Brother     Breast cancer Maternal Grandmother     Colon cancer Maternal Grandfather     Colon cancer Paternal Grandmother     Cancer Paternal Grandfather     Cancer Paternal Aunt     Cancer Paternal Uncle       Current Medications:     Current Outpatient Medications   Medication Sig Dispense Refill    ibuprofen (MOTRIN) 200 mg tablet Take by mouth every 6 (six) hours as needed for mild pain      Multiple Vitamin (MULTIVITAMIN) tablet Take 1 tablet by mouth daily       No current facility-administered medications for this visit  Allergies:     No Known Allergies   Physical Exam:     /76 (BP Location: Left arm, Patient Position: Sitting, Cuff Size: Standard)   Pulse 84   Temp 98 5 °F (36 9 °C) (Tympanic)   Resp 16   Ht 5' 5 5" (1 664 m)   Wt 61 1 kg (134 lb 9 6 oz)   SpO2 98%   BMI 22 06 kg/m²     Physical Exam  Vitals and nursing note reviewed     Constitutional:       General: She is not in acute distress  Appearance: Normal appearance  HENT:      Head: Normocephalic and atraumatic  Right Ear: Tympanic membrane, ear canal and external ear normal  There is no impacted cerumen  Left Ear: Tympanic membrane, ear canal and external ear normal  There is no impacted cerumen  Nose: Nose normal       Mouth/Throat:      Mouth: Mucous membranes are moist       Pharynx: Oropharynx is clear  No posterior oropharyngeal erythema  Eyes:      General:         Right eye: No discharge  Left eye: No discharge  Extraocular Movements: Extraocular movements intact  Pupils: Pupils are equal, round, and reactive to light  Cardiovascular:      Rate and Rhythm: Normal rate and regular rhythm  Pulses: Normal pulses  Heart sounds: Normal heart sounds  No murmur heard  Pulmonary:      Effort: Pulmonary effort is normal       Breath sounds: Normal breath sounds  Abdominal:      General: Abdomen is flat  Bowel sounds are normal  There is no distension  Palpations: Abdomen is soft  There is no mass  Tenderness: There is no abdominal tenderness  There is no guarding or rebound  Hernia: No hernia is present  Musculoskeletal:         General: Normal range of motion  Cervical back: Normal range of motion  Skin:     General: Skin is warm and dry  Capillary Refill: Capillary refill takes less than 2 seconds  Neurological:      General: No focal deficit present  Mental Status: She is alert and oriented to person, place, and time  Psychiatric:         Mood and Affect: Mood normal          Behavior: Behavior normal          Thought Content:  Thought content normal          Judgment: Judgment normal           Nacho Melendez, [address] [facility]

## 2022-03-11 NOTE — PATIENT INSTRUCTIONS

## 2022-03-12 LAB
ANION GAP SERPL CALCULATED.3IONS-SCNC: 6 MMOL/L (ref 4–13)
BUN SERPL-MCNC: 8 MG/DL (ref 5–25)
CALCIUM SERPL-MCNC: 9.6 MG/DL (ref 8.3–10.1)
CHLORIDE SERPL-SCNC: 104 MMOL/L (ref 100–108)
CHOLEST SERPL-MCNC: 201 MG/DL
CO2 SERPL-SCNC: 28 MMOL/L (ref 21–32)
CREAT SERPL-MCNC: 0.93 MG/DL (ref 0.6–1.3)
GFR SERPL CREATININE-BSD FRML MDRD: 80 ML/MIN/1.73SQ M
GLUCOSE P FAST SERPL-MCNC: 100 MG/DL (ref 65–99)
HDLC SERPL-MCNC: 55 MG/DL
LDLC SERPL CALC-MCNC: 128 MG/DL (ref 0–100)
NONHDLC SERPL-MCNC: 146 MG/DL
POTASSIUM SERPL-SCNC: 4.2 MMOL/L (ref 3.5–5.3)
SODIUM SERPL-SCNC: 138 MMOL/L (ref 136–145)
TRIGL SERPL-MCNC: 90 MG/DL

## 2022-03-14 DIAGNOSIS — D70.9 NEUTROPENIA, UNSPECIFIED TYPE (HCC): Primary | ICD-10-CM

## 2022-03-28 ENCOUNTER — TELEPHONE (OUTPATIENT)
Dept: GASTROENTEROLOGY | Facility: CLINIC | Age: 37
End: 2022-03-28

## 2022-03-28 NOTE — TELEPHONE ENCOUNTER
----- Message from Baldo Siemens sent at 3/27/2022  8:22 PM EDT -----  Regarding: Concerns  Gautam Nelson,    I have been experiencing choking/coughing more frequently lately during the day and night  I am also experiencing either losing my voice or hoarseness due to coughing  I was wondering if there is anything that could help with this       Algernon Oz

## 2022-03-28 NOTE — TELEPHONE ENCOUNTER
Please have her start a Pantoprazole 40mg BID course for possible GERD but also please have her schedule a follow up with us to further discuss her symptoms - she was last seen over a year ago

## 2022-04-04 ENCOUNTER — TELEPHONE (OUTPATIENT)
Dept: INTERNAL MEDICINE CLINIC | Facility: CLINIC | Age: 37
End: 2022-04-04

## 2022-04-04 ENCOUNTER — APPOINTMENT (OUTPATIENT)
Dept: LAB | Facility: CLINIC | Age: 37
End: 2022-04-04
Payer: COMMERCIAL

## 2022-04-04 DIAGNOSIS — D70.9 NEUTROPENIA, UNSPECIFIED TYPE (HCC): ICD-10-CM

## 2022-04-04 DIAGNOSIS — D70.9 NEUTROPENIA, UNSPECIFIED TYPE (HCC): Primary | ICD-10-CM

## 2022-04-04 LAB
BASOPHILS # BLD AUTO: 0.02 THOUSANDS/ΜL (ref 0–0.1)
BASOPHILS NFR BLD AUTO: 1 % (ref 0–1)
EOSINOPHIL # BLD AUTO: 0.09 THOUSAND/ΜL (ref 0–0.61)
EOSINOPHIL NFR BLD AUTO: 3 % (ref 0–6)
ERYTHROCYTE [DISTWIDTH] IN BLOOD BY AUTOMATED COUNT: 12.9 % (ref 11.6–15.1)
HCT VFR BLD AUTO: 35.9 % (ref 34.8–46.1)
HGB BLD-MCNC: 11.3 G/DL (ref 11.5–15.4)
IMM GRANULOCYTES # BLD AUTO: 0 THOUSAND/UL (ref 0–0.2)
IMM GRANULOCYTES NFR BLD AUTO: 0 % (ref 0–2)
LYMPHOCYTES # BLD AUTO: 1.21 THOUSANDS/ΜL (ref 0.6–4.47)
LYMPHOCYTES NFR BLD AUTO: 37 % (ref 14–44)
MCH RBC QN AUTO: 27.6 PG (ref 26.8–34.3)
MCHC RBC AUTO-ENTMCNC: 31.5 G/DL (ref 31.4–37.4)
MCV RBC AUTO: 88 FL (ref 82–98)
MONOCYTES # BLD AUTO: 0.42 THOUSAND/ΜL (ref 0.17–1.22)
MONOCYTES NFR BLD AUTO: 13 % (ref 4–12)
NEUTROPHILS # BLD AUTO: 1.57 THOUSANDS/ΜL (ref 1.85–7.62)
NEUTS SEG NFR BLD AUTO: 46 % (ref 43–75)
NRBC BLD AUTO-RTO: 0 /100 WBCS
PLATELET # BLD AUTO: 328 THOUSANDS/UL (ref 149–390)
PMV BLD AUTO: 10 FL (ref 8.9–12.7)
RBC # BLD AUTO: 4.1 MILLION/UL (ref 3.81–5.12)
WBC # BLD AUTO: 3.31 THOUSAND/UL (ref 4.31–10.16)

## 2022-04-04 PROCEDURE — 36415 COLL VENOUS BLD VENIPUNCTURE: CPT

## 2022-04-04 PROCEDURE — 85025 COMPLETE CBC W/AUTO DIFF WBC: CPT

## 2022-04-05 ENCOUNTER — TELEPHONE (OUTPATIENT)
Dept: HEMATOLOGY ONCOLOGY | Facility: CLINIC | Age: 37
End: 2022-04-05

## 2022-04-05 NOTE — TELEPHONE ENCOUNTER
New Patient Intake Form   Patient Details:    Cleo Brown  1985    Appointment Information   Who is calling to schedule? Patient   If not self, what is the caller's name? Please put name of [facility] nurse as well  Referring provider Lynn Connolly MD   What is the diagnosis? Neutropenia, unspecified type      Is there a confirmed tissue diagnosis? No   Is there a biopsy ordered or pending? Please specify dates   n/a     Is patient aware of diagnosis? Yes   Have you had any imaging or labs done? If yes, where? (If imaging done outside of [facility], please remind patient to bring a disk ) Yes     4/4/2022       If imaging done at outside facility, did you instruct patient to obtain discs and bring to visit? n/a   Have you been seen by another Oncologist/Hematologist?  If so, who and where? no   Are the records in [facility] or Care Everywhere? yes   Does the patient have records at another facility/hospital? no   If yes, Name of facility, city and state where facility is located  Did you instruct patient to have records faxed to [facility] and provide rightfax number? n/a   Preferred [place]   Is the patient willing to be seen by another provider? (This is for breast patients only) n/a     Did you send new patient paperwork?   Email or mail? n/a   Miscellaneous Information: appointment made for 4/14/22

## 2022-04-05 NOTE — PROGRESS NOTES
Hematology Outpatient Office Note    Date of Service: 4/14/2022    [facility] HEMATOLOGY SPECIALISTS       Reason for Consultation: No chief complaint on file  Referral Physician: Loan Wilson MD    Primary Care Physician:  Valdo Olson MD       ASSESSMENT & PLAN      Diagnosis ICD-10-CM Associated Orders   1  Other neutropenia ([facility] [address] )  D70 8    2  Neutropenia, unspecified type ([facility] [address] )  D70 9 Ambulatory Referral to Hematology / Oncology         This is a 31 y o  c PMHx notable for HTN in the past not requiring medications at this time, being seen in consultation for acute on chronic mild neutropenia     Autoimmune hx: none present     Ref  Range 12/18/2019 16:41 3/11/2022 17:09 4/4/2022 12:06   WBC Latest Ref Range: 4 31 - 10 16 Thousand/uL 5 63 3 76 (L) 3 31 (L)     Neutropenia: new since 3/11/2022 ([address])  The pt is without tell-tale symptoms such as frequent fever, malasie, anorexia, mouth ulcers, and cervical adenopathy  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 as she has had this since 2014, intermittently  Benign ethnic leucopenia or neutropenia: The lower limit of "normal" neutrophils is typically regarded as 1800/uL for those of  descent and 1400/uL those of  descent, while some of  descent will have [address] from 3480-2006, and sometimes less than 1000, without any identifiable disease  Other ethnic groups with genetically mediated neutropenia (Benign Ethnic Neutropenia) include Yemenite Jews, [facility] Africans, Afro-Caribbeans, Ethiopians, and [place] groups  Mild leucopenia has been observed when  patients are compared to  groups; in a study of over 3000 healthy students from [facility], the range in WBC count was from 3 3-11 2 [facility] et al, 1800 [facility])  This is lower than typical [facility] american reference ranges (for example at [facility] this range is 4 5-11)      Decreased numbers of circulating neutrophils has been found to be a result of the Boyd antigen chemokine gene, however the majority of neutrophils reside in tissues and cannot be measured  Clinicians are often worried about neutropenia contributing to infection, however the neutropenia of OSBALDO is fundamentally different than the neutropenia of chemotherapy, leukemia, or congenital neutropenia  In OSBALDO neutrophils are present, but decreased numbers are circulating; in neutropenia associated with infection and of concerning etiology, total body neutrophils are decreased as well as having a low pool of circulating neutrophils  Patients with benign neutropenia do not have infectious problems as most of the neutrophil population resides in tissues and not the blood  The differential otherwise includes decreased production, increased destruction, and maldistribution  Decreased production can occur with: congenital neutropenias (Kostmann syndrome with recurring infections near birth), X-linked agammaglobinemia,  reticular dysgenesis, Shwachman-sheree syndrome (exocrine pancreas dysfunction and neutropenia),Hyper IgM syndrome, Cartilage-hair hypoplasia (dwarfism, hyperextension, lymphopenia and neutropenia), X-linked cardioskeletal myopathy (Slime Syndrome), WHIM syndrome (warts, hypogammaglobinemia, infection, myelokathexis with lymphopenia and neutropenia), [place] sydrome (albanism, recurring infections), Griscelli syndrome (hypopigmentation and immunodeficiency), glycogen storage disease 1b (hypoglycemia, failure to thrive, seizures, hepatosplenomegaly), Hermansky-Pudlak syndrome, [place] syndrome, chronic hypoplastic neutropenia (drug induced, cyclic, branched chain aminoacidemia, idiopathic, mutations in folate or cobalamin metabolism, copper deficiency), acute hypoplastic neutropenia (infection, drug induced), chronic idiopathic neutropenia (familial, sporadic)   Hematologic disorders such as LGL, MDS, aplasic anemia, and leukemia can also cause decreased production  Increased destruction may result from: alloimmune  neutropenia, autoimmune neutropenia (SLE, complement induced, other autoimmune disease, felty syndrome, drug induced), PNH, and LGL  Although Felty's syndrome was initially described with splenomegaly, others have found that RA patients with and without splenomegaly were clinically indistinguishable, indicating that splenomegaly should not be considered an essential feature of this disorder  (Zaida Stevenson et al  6356) Neutropenia is also found in about half of SLE patients and it correlates with disease activity  Cyclic Neutropenia results in oscillating neutropenia q21 days, often with a severe les and patients may have oscillations of other white cells, reticulocytes, and platelets  The neutropenic window lasts for 3-6 days, and is often accompanied by fever, malasie, anorexia, mouth ulcers, and cervical adenopathy  Mutations are in the ELA2 gene, and periodicity is found with twice weekly CBC for 6 weeks  Treatment with GCSF shortens the neutropenic window  Chronic Idiopathic Neutropenia in Adults: this syndrome primarily affects young women (age 20-32)  Reticulocyte, erythroid, and platelet counts are normal, however leukopenia or lymphocytopenia may be present  Bone marrow biopsies are unremarkable or show granulocyte hypoplasia; no cytogenetic abnormalities are found  Antineutrophil antibodies, autoantibodies (LINWOOD, etc) are normal  Underlying physiology is increased apoptosis of neutrophils  Immune mediated neutropenias: In patients with SLE, leukopenia (<2000) and neutropenia (<1800) are found in ~50% of patients  In RA around 3% of patients will have leukopenia  In Sjogren sydrome ~30% of patients will have leukopenia (0865-5823)  Secondary autoimmune granulocytopenia occurs in some conditions such as CLL, SLE, RA, Sjogren's, celiac disease, hyperthyroidism   LGL is a common cause of secondary AIG and would to be excluded  Most cases of AIG will resolve spontaneously over 1-3 weeks  Risk of infection is increased with [address] <500  Administration of G-CSF usually improves neutropenia with 3-5 days, however neutropenia may occur again after discontinuation  There is a report Katrina Wilhelm et al 2015) of improvement in autoimmune neutropenia with IVIG treatment (0 4mg/kg x 3days), however treatment is not curative and neutropenia may recur  Treatment with prednisone is effective in ~50% of cases  In a prospective study Adryan Padilla et al 2007) the most common etiology of agranulocytosis was chemotherapy medications (78% )  Other causes were drugs (3 4%), myelodysplasia (5 4%), HIV (4 1%), other viral infection (2 1%), autoimmune disease (2 2%), cirrhosis (0 4%), and no identified cause/idiopathic (4 5%)  -reticulocyte count, IPF  -immunoglobulins  -LINWOOD, RF, ANCA, anti-DS DNA  -peripheral smear  -HIV, Hepatitis serologies (A,B, C), CMV PCR, EBV PCR  -TSH  -copper, Vit B12, MMA   -peripheral smear to rule out LGL  -bone marrow biopsy if concerning findings above but don't anticipate it  -PCP w/u for elevated BP at young age (<30years of age)      · Discussion of decision making    I personally reviewed the following lab results, the image studies, pathology, other specialty/physicians consult notes and recommendations, and outside medical records from [facility]  I had a lengthy discussion with the patient and shared the work-up findings  We discussed the diagnosis and management plan as below  I spent 47 minutes reviewing the records (labs, clinician notes, outside records, medical history, ordering medicine/tests/procedures, interpreting the imaging/labs previously done) and coordination of care as well as direct time with the patient today, of which greater than 50% of the time was spent in counseling and coordination of care with the patient/family        Follow Up: 6 months CBC with prn f/u based on the results    All questions were answered to the patient's satisfaction during this encounter  The patient knows the contact information for our office and knows to reach out for any relevant concerns related to this encounter  They are to call for any temperature 100 4 or higher, new symptoms including but not restricted to shaking chills, decreased appetite, nausea, vomiting, diarrhea, increased fatigue, shortness of breath or chest pain, confusion, and not feeling the strength to come to the clinic  For all other listed problems and medical diagnosis in their chart - they are managed by PCP and/or other specialists, which the patient acknowledges  Thank you very much for your consultation and making us a part of this patient's care  We are continuing to follow closely with you  Please do not hesitate to reach out to me with any additional questions or concerns  Atif Louis MD  Hematology & Medical Oncology Staff Physician             Disclaimer: This document was prepared using [employer] technology  If a word or phrase is confusing, or does not make sense, this is likely due to recognition error which was not discovered during this clinician's review  If you believe an error has occurred, please contact me through [address] [place] line for natty? cation  HEMATOLOGICAL HISTORY OF PRESENT ILLNESS      Clotting History None   Bleeding History Significant menstrual period blood loss   Cancer History None   Family Cancer History mGrandmother (breast), mGrandfather (colon), pGrandparents (cancer)   H/O Blood/Plt Transfusion None   Tobacco/etoh/drug abuse Never nicotine user, no etoh abuse, no rec drug use   Hx COVID19 Infection and Vaccine Status Neither   Cancer Screening history Pap smear (7/2021)   Occupation    New medications in the last month: None  Pain: None    In 2014 her WBC 2 9k to 3k (she had low grade fevers at this time but not since then)   She had some blood work between 2014 and 2019 and it was low as well between those years  When she had her blood work 3/2022, she had low grade fevers, vomiting, diarrhea and thinks she may have had a stomach virus at that time Minus)  She does have cervical adenopathy that she has noted for a long time  She has infrequent mouth ulcers (it has been a long time)  Denies weight loss, anorexia, night sweats  She has had sporadic LH/HA since January SUBJECTIVE  (INTERVAL HISTORY)        I have reviewed the relevant past medical, surgical, social and family history  I have also reviewed allergies and medications for this patient  Review of Systems  Denies abd pain, rash, itching, hematuria, melena, hematochezia, falls, generalized weakness, unilateral weakness, numbness, tingling  A 10-point review of system was performed, pertinent positive and negative were detailed as above  Otherwise, the 10-point review of system was negative        Past Medical History:   Diagnosis Date    Hypertension        Past Surgical History:   Procedure Laterality Date    HEEL SPUR EXCISION Right 2016    TONSILLECTOMY  1979    TUBAL LIGATION  10/01/2018       Family History   Problem Relation Age of Onset    Hypertension Mother     Hypertension Brother     Asthma Brother     Breast cancer Maternal Grandmother     Colon cancer Maternal Grandfather     Colon cancer Paternal Grandmother     Cancer Paternal Grandfather     Cancer Paternal Aunt     Cancer Paternal Uncle        Social History     Socioeconomic History    Marital status: Legally      Spouse name: Not on file    Number of children: Not on file    Years of education: Not on file    Highest education level: Not on file   Occupational History    Not on file   Tobacco Use    Smoking status: Never Smoker    Smokeless tobacco: Never Used   Vaping Use    Vaping Use: Never used   Substance and Sexual Activity    Alcohol use: Yes     Comment: occasionally  Drug use: Never    Sexual activity: Not Currently     Partners: Male   Other Topics Concern    Not on file   Social History Narrative    Not on file     Social Determinants of Health     Financial Resource Strain: Not on file   Food Insecurity: Not on file   Transportation Needs: Not on file   Physical Activity: Not on file   Stress: Not on file   Social Connections: Not on file   Intimate Partner Violence: Not on file   Housing Stability: Not on file       No Known Allergies    Current Outpatient Medications   Medication Sig Dispense Refill    ibuprofen (MOTRIN) 200 mg tablet Take by mouth every 6 (six) hours as needed for mild pain      Multiple Vitamin (MULTIVITAMIN) tablet Take 1 tablet by mouth daily      pantoprazole (PROTONIX) 40 mg tablet Take 1 tablet (40 mg total) by mouth 2 (two) times a day 60 tablet 1     No current facility-administered medications for this visit  (Not in a hospital admission)        Objective:     24 Hour Vitals Assessment:     Vitals:    04/14/22 0755   BP: 132/74   Pulse: 99   Resp: 16   Temp: (!) 97 4 °F (36 3 °C)   SpO2: 99%       PHYSICIAN EXAM:    General: Appearance: alert, cooperative, no distress  HEENT: Normocephalic, atraumatic  No scleral icterus  conjunctivae clear  EOMI  Fullness to the neck b/l but no specific adenopathy  Chest: No tenderness to palpation  No open wound noted  Lungs: Clear to auscultation bilaterally, Respirations unlabored  Cardiac: Regular rate and rhythm, +S1and S2, -r/m/g  Abdomen: Soft, non-tender, non-distended  Bowel sounds are normal  No splenomegaly on exam  Extremities:  No edema, cyanosis, clubbing  Skin: Skin color, turgor are normal  No rashes  Lymphatics: no palpable supra-cervical, axillary, or inguinal adenopathy  Neurologic: Awake, Alert, and oriented, no gross focal deficits noted b/l  DATA REVIEW:    Pathology Result:    Final Diagnosis   Date Value Ref Range Status   12/30/2020   Final    A   Distal esophagus (biopsy):    - Squamous mucosa with no pathologic abnormality      - Eosinophils number less than 2 per HPF      - No glandular mucosa present for evaluation     - No dysplasia or neoplasia identified  B  Proximal esophagus (biopsy):    - Squamous mucosa with no pathologic abnormality      - Eosinophils number less than 2 per HPF      - No dysplasia or neoplasia identified  01/15/2020   Final    A  Colon, rectum, polyp:     - Hyperplastic polyp      - No dysplasia or carcinoma identified  Image Results:   Image result are reviewed and documented in Hematology/Oncology history  I personally reviewed these images  EGD  Narrative: [id number] [facility] Operating Room  [facility] [address]  [phone]    DATE OF SERVICE:  12/30/20    PHYSICIAN(S):  Luis Chairez MD - Attending Physician    INDICATION:  Gastroesophageal reflux disease, unspecified whether esophagitis present,   Dysphagia, unspecified type, Abnormal CT scan, esophagus    POST-OP DIAGNOSIS:  See the impression below  PREPROCEDURE:  Informed consent was obtained for the procedure, including sedation  Risks of perforation, hemorrhage, adverse drug reaction and aspiration   were discussed  The patient was placed in the left lateral decubitus   position  Patient was explained about the risks and benefits of the procedure  Risks   including but not limited to bleeding, infection, and perforation were   explained in detail  Also explained about less than 100% sensitivity with   the exam and other alternatives  DETAILS OF PROCEDURE:  Patient was taken to the procedure room where a time out was performed to   confirm correct patient and correct procedure   The patient underwent   monitored anesthesia care, which was administered by an anesthesia   professional  The patient's blood pressure, heart rate, level of   consciousness, respirations and oxygen were monitored throughout the procedure  The scope was advanced to the third part of the duodenum  Retroflexion was performed in the cardia, fundus and incisura  The   patient's estimated blood loss was minimal (<5 mL)  The procedure was not   difficult  The patient tolerated the procedure well  There were no   apparent complications  ANESTHESIA INFORMATION:  ASA: II  Anesthesia Type: IV Sedation with Anesthesia    FINDINGS:  The esophagus, stomach and duodenum appeared normal   Performed biopsies in the upper third of the esophagus and lower third of   the esophagus  Dilated in the esophagus with Savary-Izabel dilator using guidewire    SPECIMENS:  ID Type Source Tests Collected by Time Destination   1 : distal Tissue Esophagus TISSUE EXAM Rolando Melgoza MD 12/30/2020    9:52 AM    2 : proximal Tissue Esophagus TISSUE EXAM Rolando Melgoza MD 12/30/2020    9:53 AM         Impression: Normal upper GI endoscopy status post esophageal biopsy and Savary   dilation, [address], over guidewire     RECOMMENDATION:  Continue current medical management  Follow up biopsy results in 1 week       Rolando Melgoza MD      LABS:  Lab data are reviewed and documented in HemOnc history  Lab Results   Component Value Date    HGB 11 3 (L) 04/04/2022    HCT 35 9 04/04/2022    MCV 88 04/04/2022     04/04/2022    WBC 3 31 (L) 04/04/2022    NRBC 0 04/04/2022     Lab Results   Component Value Date    K 4 2 03/11/2022     03/11/2022    CO2 28 03/11/2022    BUN 8 03/11/2022    CREATININE 0 93 03/11/2022    GLUF 100 (H) 03/11/2022    CALCIUM 9 6 03/11/2022    AST 10 12/18/2019    ALT 18 12/18/2019    ALKPHOS 57 12/18/2019    EGFR 80 03/11/2022       No results found for: IRON, TIBC, FERRITIN    No results found for: [id number]    No results for input(s): WBC, CREAT in the last 72 hours      Invalid input(s):  PLT     By:  Red Naylor MD, 4/14/2022, 8:07 AM

## 2022-04-14 ENCOUNTER — CONSULT (OUTPATIENT)
Dept: HEMATOLOGY ONCOLOGY | Facility: CLINIC | Age: 37
End: 2022-04-14
Payer: COMMERCIAL

## 2022-04-14 ENCOUNTER — APPOINTMENT (OUTPATIENT)
Dept: LAB | Facility: HOSPITAL | Age: 37
End: 2022-04-14
Payer: COMMERCIAL

## 2022-04-14 VITALS
SYSTOLIC BLOOD PRESSURE: 132 MMHG | RESPIRATION RATE: 16 BRPM | HEIGHT: 65 IN | TEMPERATURE: 97.4 F | OXYGEN SATURATION: 99 % | DIASTOLIC BLOOD PRESSURE: 74 MMHG | HEART RATE: 99 BPM | WEIGHT: 138 LBS | BODY MASS INDEX: 22.99 KG/M2

## 2022-04-14 DIAGNOSIS — D70.9 NEUTROPENIA, UNSPECIFIED TYPE (HCC): ICD-10-CM

## 2022-04-14 DIAGNOSIS — D70.8 OTHER NEUTROPENIA (HCC): ICD-10-CM

## 2022-04-14 DIAGNOSIS — D70.8 OTHER NEUTROPENIA (HCC): Primary | ICD-10-CM

## 2022-04-14 LAB
FERRITIN SERPL-MCNC: 4 NG/ML (ref 8–388)
HGB RETIC QN AUTO: 28.8 PG (ref 30–38.3)
IMM EOSINOPHIL NFR BLD MANUAL: 3 % (ref 0–6)
IMM RETICS NFR: 13.6 % (ref 0–14)
IRON SATN MFR SERPL: 9 % (ref 15–50)
IRON SERPL-MCNC: 39 UG/DL (ref 50–170)
LYMPHOCYTES NFR BLD: 56 % (ref 14–44)
MONOCYTES NFR BLD AUTO: 5 % (ref 4–12)
NEUTS SEG NFR BLD AUTO: 36 % (ref 45–77)
PATHOLOGY REVIEW: YES
PLATELETS.RETICULATED NFR BLD AUTO: 4.2 %
RBC MORPH BLD: NORMAL
RETICS # AUTO: ABNORMAL 10*3/UL (ref 14097–95744)
RETICS # CALC: 1.25 % (ref 0.37–1.87)
TIBC SERPL-MCNC: 413 UG/DL (ref 250–450)
TOTAL CELLS COUNTED SPEC: 100
TSH SERPL DL<=0.05 MIU/L-ACNC: 2.81 UIU/ML (ref 0.45–4.5)
VIT B12 SERPL-MCNC: 409 PG/ML (ref 100–900)

## 2022-04-14 PROCEDURE — 36415 COLL VENOUS BLD VENIPUNCTURE: CPT

## 2022-04-14 PROCEDURE — 85046 RETICYTE/HGB CONCENTRATE: CPT

## 2022-04-14 PROCEDURE — 99204 OFFICE O/P NEW MOD 45 MIN: CPT | Performed by: INTERNAL MEDICINE

## 2022-04-14 PROCEDURE — 83520 IMMUNOASSAY QUANT NOS NONAB: CPT

## 2022-04-14 PROCEDURE — 86705 HEP B CORE ANTIBODY IGM: CPT

## 2022-04-14 PROCEDURE — 87389 HIV-1 AG W/HIV-1&-2 AB AG IA: CPT

## 2022-04-14 PROCEDURE — 83540 ASSAY OF IRON: CPT

## 2022-04-14 PROCEDURE — 86704 HEP B CORE ANTIBODY TOTAL: CPT

## 2022-04-14 PROCEDURE — 86803 HEPATITIS C AB TEST: CPT

## 2022-04-14 PROCEDURE — 84630 ASSAY OF ZINC: CPT

## 2022-04-14 PROCEDURE — 3008F BODY MASS INDEX DOCD: CPT | Performed by: INTERNAL MEDICINE

## 2022-04-14 PROCEDURE — 86038 ANTINUCLEAR ANTIBODIES: CPT

## 2022-04-14 PROCEDURE — 86037 ANCA TITER EACH ANTIBODY: CPT

## 2022-04-14 PROCEDURE — 82607 VITAMIN B-12: CPT

## 2022-04-14 PROCEDURE — 83550 IRON BINDING TEST: CPT

## 2022-04-14 PROCEDURE — 84443 ASSAY THYROID STIM HORMONE: CPT

## 2022-04-14 PROCEDURE — 82525 ASSAY OF COPPER: CPT

## 2022-04-14 PROCEDURE — 83918 ORGANIC ACIDS TOTAL QUANT: CPT

## 2022-04-14 PROCEDURE — 82728 ASSAY OF FERRITIN: CPT

## 2022-04-14 PROCEDURE — 87340 HEPATITIS B SURFACE AG IA: CPT

## 2022-04-14 PROCEDURE — 86225 DNA ANTIBODY NATIVE: CPT

## 2022-04-14 PROCEDURE — 85055 RETICULATED PLATELET ASSAY: CPT

## 2022-04-14 PROCEDURE — 85007 BL SMEAR W/DIFF WBC COUNT: CPT

## 2022-04-14 PROCEDURE — 1036F TOBACCO NON-USER: CPT | Performed by: INTERNAL MEDICINE

## 2022-04-14 PROCEDURE — 86430 RHEUMATOID FACTOR TEST QUAL: CPT

## 2022-04-14 NOTE — Clinical Note
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

## 2022-04-15 LAB
DSDNA AB SER-ACNC: 1 IU/ML (ref 0–9)
HBV CORE AB SER QL: NORMAL
HBV CORE IGM SER QL: NORMAL
HBV SURFACE AG SER QL: NORMAL
HCV AB SER QL: NORMAL
HIV 1+2 AB+HIV1 P24 AG SERPL QL IA: NORMAL
RHEUMATOID FACT SER QL LA: NEGATIVE
RYE IGE QN: NEGATIVE

## 2022-04-17 LAB — ZINC SERPL-MCNC: 77 UG/DL (ref 44–115)

## 2022-04-18 ENCOUNTER — APPOINTMENT (OUTPATIENT)
Dept: LAB | Facility: CLINIC | Age: 37
End: 2022-04-18
Payer: COMMERCIAL

## 2022-04-18 DIAGNOSIS — D70.8 OTHER NEUTROPENIA (HCC): ICD-10-CM

## 2022-04-18 DIAGNOSIS — D70.9 NEUTROPENIA, UNSPECIFIED TYPE (HCC): Primary | ICD-10-CM

## 2022-04-18 LAB
C-ANCA TITR SER IF: NORMAL TITER
MYELOPEROXIDASE AB SER IA-ACNC: <9 U/ML (ref 0–9)
P-ANCA ATYPICAL TITR SER IF: NORMAL TITER
P-ANCA TITR SER IF: NORMAL TITER
PROTEINASE3 AB SER IA-ACNC: <3.5 U/ML (ref 0–3.5)

## 2022-04-18 PROCEDURE — 88184 FLOWCYTOMETRY/ TC 1 MARKER: CPT

## 2022-04-18 PROCEDURE — 36415 COLL VENOUS BLD VENIPUNCTURE: CPT

## 2022-04-18 PROCEDURE — 88185 FLOWCYTOMETRY/TC ADD-ON: CPT

## 2022-04-18 NOTE — PROGRESS NOTES
I discussed the following results with the patient this afternoon:     4/14/22 peripheral smear: Mild normocytic normochromic anemia  Mild leukopenia with absolute neutropenia with normal morphology  Occasional reactive and large granular lymphocytes are present  4/14/22: iron 39 (low), ferritin 4 (low), Fe sat 9% (low), TIBC 413, Vit B12  GUS  LINWOOD, RF, chronic hepatitis panel WNL  HIV negative, Zinc negative    I recommended she start taking iron by mouth over-the-counter Monday, Wednesday, Friday  Also recommended flow cytometry to rule out LGL which I am now ordering       Kayode Mccall MD

## 2022-04-19 LAB — COPPER SERPL-MCNC: 114 UG/DL (ref 80–158)

## 2022-04-21 LAB — SCAN RESULT: NORMAL

## 2022-04-22 LAB — METHYLMALONATE SERPL-SCNC: 137 NMOL/L (ref 0–378)

## 2022-05-02 ENCOUNTER — OFFICE VISIT (OUTPATIENT)
Dept: INTERNAL MEDICINE CLINIC | Facility: CLINIC | Age: 37
End: 2022-05-02
Payer: COMMERCIAL

## 2022-05-02 VITALS
HEART RATE: 73 BPM | WEIGHT: 144.6 LBS | RESPIRATION RATE: 16 BRPM | HEIGHT: 65 IN | TEMPERATURE: 98.1 F | SYSTOLIC BLOOD PRESSURE: 146 MMHG | OXYGEN SATURATION: 96 % | DIASTOLIC BLOOD PRESSURE: 74 MMHG | BODY MASS INDEX: 24.09 KG/M2

## 2022-05-02 DIAGNOSIS — I10 ESSENTIAL HYPERTENSION: ICD-10-CM

## 2022-05-02 DIAGNOSIS — G43.109 MIGRAINE WITH AURA AND WITHOUT STATUS MIGRAINOSUS, NOT INTRACTABLE: Primary | ICD-10-CM

## 2022-05-02 DIAGNOSIS — J06.9 UPPER RESPIRATORY TRACT INFECTION, UNSPECIFIED TYPE: ICD-10-CM

## 2022-05-02 PROBLEM — G43.009 MIGRAINE WITHOUT AURA AND WITHOUT STATUS MIGRAINOSUS, NOT INTRACTABLE: Status: ACTIVE | Noted: 2022-05-02

## 2022-05-02 PROCEDURE — 99214 OFFICE O/P EST MOD 30 MIN: CPT | Performed by: INTERNAL MEDICINE

## 2022-05-02 RX ORDER — SUMATRIPTAN 50 MG/1
50 TABLET, FILM COATED ORAL ONCE AS NEEDED
Qty: 9 TABLET | Refills: 0 | Status: SHIPPED | OUTPATIENT
Start: 2022-05-02

## 2022-05-02 NOTE — PROGRESS NOTES
INTERNAL MEDICINE OFFICE VISIT  [facility]HAV[facility]IC[facility] AT [address]  Tel: [phone]      NAME: Kike Schmitt  AGE: 31 y o  SEX: female  : 1985   MRN: [id number]    DATE: 2022  TIME: 11:54 AM      Assessment and Plan:  1  Migraine with aura and without status migrainosus, not intractable  Was started on sumatriptan to see if it helps  If it does not, she will be started on a prophylactic dose of either propranolol or Topamax and also be referred to neurology     - SUMAtriptan (IMITREX) 50 mg tablet; Take 1 tablet (50 mg total) by mouth once as needed for migraine for up to 1 dose  Dispense: 9 tablet; Refill: 0    2  Essential hypertension  Was advised to cut down on the salt intake in her diet as much as possible and continue to monitor blood pressure regularly  I will see her back in a month  I did not start her on medication today    3  Upper respiratory tract infection, unspecified type   was told to take Tylenol as needed and drink a lot of fluids  She was also told to use the throat spray in case the pain in the throat is a lot  - Counseling Documentation: patient was counseled regarding: instructions for management, risk factor reductions, prognosis, patient and family education, risks and benefits of treatment options and importance of compliance with treatment  - Medication Side Effects: Adverse side effects of medications were reviewed with the patient/guardian today  Return for follow up visit in   One month or earlier, if needed  Chief Complaint:  Chief Complaint   Patient presents with    Headache    Sore Throat         History of Present Illness:    patient has very frequent headaches, almost twice a week and has been taking Aleve for the relief of the headaches  She also gets headaches before her periods  Her blood pressure today was high and that could be another cause of the headache        Active Problem List:  Patient Active Problem List   Diagnosis    Family history of colon cancer    Chronic idiopathic constipation    COVID-19 vaccination declined    Tetanus, diphtheria, and acellular pertussis (Tdap) vaccination declined    Influenza vaccination declined    Neutropenia ([facility] )    Migraine with aura and without status migrainosus, not intractable    Essential hypertension         Past Medical History:  Past Medical History:   Diagnosis Date    Hypertension          Past Surgical History:  Past Surgical History:   Procedure Laterality Date    HEEL SPUR EXCISION Right 2016    TONSILLECTOMY  1979    TUBAL LIGATION  10/01/2018         Family History:  Family History   Problem Relation Age of Onset    Hypertension Mother     Hypertension Brother     Asthma Brother     Breast cancer Maternal Grandmother     Colon cancer Maternal Grandfather     Colon cancer Paternal Grandmother     Cancer Paternal Grandfather     Cancer Paternal Aunt     Cancer Paternal Uncle          Social History:  Social History     Socioeconomic History    Marital status: Legally      Spouse name: None    Number of children: None    Years of education: None    Highest education level: None   Occupational History    None   Tobacco Use    Smoking status: Never Smoker    Smokeless tobacco: Never Used   Vaping Use    Vaping Use: Never used   Substance and Sexual Activity    Alcohol use: Yes     Comment: occasionally    Drug use: Never    Sexual activity: Not Currently     Partners: Male   Other Topics Concern    None   Social History Narrative    None     Social Determinants of Health     Financial Resource Strain: Not on file   Food Insecurity: Not on file   Transportation Needs: Not on file   Physical Activity: Not on file   Stress: Not on file   Social Connections: Not on file   Intimate Partner Violence: Not on file   Housing Stability: Not on file         Allergies:  No Known Allergies      Medications:    Current Outpatient Medications:     Ferrous Sulfate (IRON SUPPLEMENT PO), , Disp: , Rfl:     ibuprofen (MOTRIN) 200 mg tablet, Take by mouth every 6 (six) hours as needed for mild pain, Disp: , Rfl:     Multiple Vitamin (MULTIVITAMIN) tablet, Take 1 tablet by mouth daily, Disp: , Rfl:     pantoprazole (PROTONIX) 40 mg tablet, Take 1 tablet (40 mg total) by mouth 2 (two) times a day, Disp: 60 tablet, Rfl: 1    SUMAtriptan (IMITREX) 50 mg tablet, Take 1 tablet (50 mg total) by mouth once as needed for migraine for up to 1 dose, Disp: 9 tablet, Rfl: 0      The following portions of the patient's history were reviewed and updated as appropriate: past medical history, past surgical history, family history, social history, allergies, current medications and active problem list       Review of Systems:  Constitutional: Denies fever, chills, weight gain, weight loss, fatigue  Eyes: Denies eye redness, eye discharge, double vision, change in visual acuity  ENT: Denies hearing loss, tinnitus, sneezing, nasal congestion, nasal discharge, sore throat   Respiratory: Denies cough, expectoration, hemoptysis, shortness of breath, wheezing  Cardiovascular: Denies chest pain, palpitations, lower extremity swelling, orthopnea, PND  Gastrointestinal: Denies abdominal pain, heartburn, nausea, vomiting, hematemesis, diarrhea, bloody stools  Genito-Urinary: Denies dysuria, frequency, difficulty in micturition, nocturia, incontinence  Musculoskeletal: Denies back pain, joint pain, muscle pain  Neurologic: Denies confusion, lightheadedness, syncope,  Has frequent headache, denies focal weakness, sensory changes, seizures  Endocrine: Denies polyuria, polydipsia, temperature intolerance  Allergy and Immunology: Denies hives, insect bite sensitivity  Hematological and Lymphatic: Denies bleeding problems, swollen glands   Psychological: Denies depression, suicidal ideation, anxiety, panic, mood swings  Dermatological: Denies pruritus, rash, skin lesion changes      Vitals:  Vitals:    05/02/22 1131   BP: 146/74   Pulse: 73   Resp: 16   Temp: 98 1 °F (36 7 °C)   SpO2: 96%       Body mass index is 24 06 kg/m²  Weight (last 2 days)     Date/Time Weight    05/02/22 1131 65 6 (144 6)            Physical Examination:  General: Patient is not in acute distress  Awake, alert, responding to commands  No weight gain or loss  Head: Normocephalic  Atraumatic  Eyes: Conjunctiva and lids with no swelling, erythema or discharge  Both pupils normal sized, round and reactive to light  Sclera nonicteric  ENT: External examination of nose and ear normal  Otoscopic examination shows translucent tympanic membranes with patent canals without erythema  Oropharynx moist with no erythema, edema, exudate or lesions  Neck: Supple  JVP not raised  Trachea midline  No masses  No thyromegaly  Lungs: No signs of increased work of breathing or respiratory distress  Bilateral bronchovascular breath sounds with no crackles or rhonchi  Chest wall: No tenderness  Cardiovascular: Normal PMI  No thrills  Regular rate and rhythm  S1 and S2 normal  No murmur, rub or gallop  Gastrointestinal: Abdomen soft, nontender  No guarding or rigidity  Liver and spleen not palpable  Bowel sounds present  Neurologic: Cranial nerves II-XII intact   Cortical functions normal  Motor system - Reflexes 2+ and symmetrical  Sensations normal  Musculoskeletal: Gait normal  No joint tenderness  Integumentary: Skin normal with no rash or lesions  Lymphatic: No palpable lymph nodes in neck, axilla or groin  Extremities: No clubbing, cyanosis, edema or varicosities  Psychological: Judgement and insight normal  Mood and affect normal      Laboratory Results:  CBC with diff:   Lab Results   Component Value Date    WBC 3 31 (L) 04/04/2022    RBC 4 10 04/04/2022    HGB 11 3 (L) 04/04/2022    HCT 35 9 04/04/2022    MCV 88 04/04/2022    MCH 27 6 04/04/2022    RDW 12 9 04/04/2022     04/04/2022       CMP:  Lab Results   Component Value Date    CREATININE 0 93 03/11/2022    BUN 8 03/11/2022    K 4 2 03/11/2022     03/11/2022    CO2 28 03/11/2022    ALKPHOS 57 12/18/2019    ALT 18 12/18/2019    AST 10 12/18/2019       No results found for: HGBA1C, MG, PHOS    No results found for: TROPONINI, CKMB, CKTOTAL    Lipid Profile:   No results found for: CHOL  Lab Results   Component Value Date    HDL 55 03/11/2022    HDL 54 12/18/2019     Lab Results   Component Value Date    LDLCALC 128 (H) 03/11/2022    LDLCALC 116 (H) 12/18/2019     Lab Results   Component Value Date    TRIG 90 03/11/2022    TRIG 62 12/18/2019       Imaging Results:  EGD  Narrative: [id number] Operating Room  [address] [id number] [phone]    DATE OF SERVICE:  12/30/20    PHYSICIAN(S):  Allan Cheung MD - Attending Physician    INDICATION:  Gastroesophageal reflux disease, unspecified whether esophagitis present,   Dysphagia, unspecified type, Abnormal CT scan, esophagus    POST-OP DIAGNOSIS:  See the impression below  PREPROCEDURE:  Informed consent was obtained for the procedure, including sedation  Risks of perforation, hemorrhage, adverse drug reaction and aspiration   were discussed  The patient was placed in the left lateral decubitus   position  Patient was explained about the risks and benefits of the procedure  Risks   including but not limited to bleeding, infection, and perforation were   explained in detail  Also explained about less than 100% sensitivity with   the exam and other alternatives  DETAILS OF PROCEDURE:  Patient was taken to the procedure room where a time out was performed to   confirm correct patient and correct procedure   The patient underwent   monitored anesthesia care, which was administered by an anesthesia   professional  The patient's blood pressure, heart rate, level of   consciousness, respirations and oxygen were monitored throughout the   procedure  The scope was advanced to the third part of the duodenum  Retroflexion was performed in the cardia, fundus and incisura  The   patient's estimated blood loss was minimal (<5 mL)  The procedure was not   difficult  The patient tolerated the procedure well  There were no   apparent complications       ANESTHESIA INFORMATION:  ASA: II  Anesthesia Type: IV Sedation with Anesthesia    FINDINGS:  The esophagus, stomach and duodenum appeared normal   Performed biopsies in the upper third of the esophagus and lower third of   the esophagus  Dilated in the esophagus with Savary-Izabel dilator using guidewire    SPECIMENS:  ID Type Source Tests Collected by Time Destination   1 : distal Tissue Esophagus TISSUE EXAM Micheal Delarosa MD 12/30/2020    9:52 AM    2 : proximal Tissue Esophagus TISSUE EXAM Micheal Delarosa MD 12/30/2020    9:53 AM         Impression: Normal upper GI endoscopy status post esophageal biopsy and Savary   dilation, 47 [place], over guidewire     RECOMMENDATION:  Continue current medical management  Follow up biopsy results in 1 week       Micheal Delarosa MD       Health Maintenance:  Health Maintenance   Topic Date Due    COVID-19 Vaccine (1) 06/11/2022 (Originally 7/5/1992)    DTaP,Tdap,and Td Vaccines (1 - Tdap) 03/11/2023 (Originally 7/5/2008)    Influenza Vaccine (Season Ended) 09/01/2022    Depression Screening  03/11/2023    Annual Physical  03/11/2023    BMI: Adult  04/14/2023    Cervical Cancer Screening  09/26/2024    Colorectal Cancer Screening  01/15/2025    HIV Screening  Completed    Hepatitis C Screening  Completed    Pneumococcal Vaccine: Pediatrics (0 to 5 Years) and At-Risk Patients (6 to 59 Years)  Aged Out    HIB Vaccine  Aged Out    Hepatitis B Vaccine  Aged Out    IPV Vaccine  Aged Out    Hepatitis A Vaccine  Aged Out    Meningococcal ACWY Vaccine  Aged Out    HPV Vaccine  Aged Out       There is no immunization history on file for this patient        Baldemar Saxena MD  8/9/0960,36:31 AM

## 2022-05-19 ENCOUNTER — RA CDI HCC (OUTPATIENT)
Dept: OTHER | Facility: HOSPITAL | Age: 37
End: 2022-05-19

## 2022-05-19 NOTE — PROGRESS NOTES
[facility]  coding opportunities          Chart Reviewed number of suggestions sent to Provider: 1     Patients Insurance        Commercial Insurance: [employer] on 5/26/22    Please refer to 8/26/2020 - please review if this is current for 2022?      I47 9: Paroxysmal tachycardia ([facility] )

## 2022-05-26 ENCOUNTER — OFFICE VISIT (OUTPATIENT)
Dept: INTERNAL MEDICINE CLINIC | Facility: CLINIC | Age: 37
End: 2022-05-26
Payer: COMMERCIAL

## 2022-05-26 VITALS
SYSTOLIC BLOOD PRESSURE: 146 MMHG | TEMPERATURE: 98.1 F | RESPIRATION RATE: 16 BRPM | HEIGHT: 65 IN | OXYGEN SATURATION: 100 % | BODY MASS INDEX: 23.59 KG/M2 | WEIGHT: 141.6 LBS | DIASTOLIC BLOOD PRESSURE: 74 MMHG | HEART RATE: 76 BPM

## 2022-05-26 DIAGNOSIS — G43.109 MIGRAINE WITH AURA AND WITHOUT STATUS MIGRAINOSUS, NOT INTRACTABLE: Primary | ICD-10-CM

## 2022-05-26 DIAGNOSIS — I10 ESSENTIAL HYPERTENSION: ICD-10-CM

## 2022-05-26 PROBLEM — Z28.21 COVID-19 VACCINATION DECLINED: Status: RESOLVED | Noted: 2022-03-11 | Resolved: 2022-05-26

## 2022-05-26 PROCEDURE — 3008F BODY MASS INDEX DOCD: CPT | Performed by: INTERNAL MEDICINE

## 2022-05-26 PROCEDURE — 1036F TOBACCO NON-USER: CPT | Performed by: INTERNAL MEDICINE

## 2022-05-26 PROCEDURE — 99213 OFFICE O/P EST LOW 20 MIN: CPT | Performed by: INTERNAL MEDICINE

## 2022-05-26 RX ORDER — PROPRANOLOL HYDROCHLORIDE 80 MG/1
80 CAPSULE, EXTENDED RELEASE ORAL DAILY
Qty: 90 CAPSULE | Refills: 2 | Status: SHIPPED | OUTPATIENT
Start: 2022-05-26

## 2022-05-26 NOTE — PROGRESS NOTES
INTERNAL MEDICINE FOLLOW-UP OFFICE VISIT  [facility] of BEHAVIORAL MEDICINE AT [facility] [place]    NAME: Marianne Milligan  AGE: 31 y o  SEX: female  : 1985   MRN: [id number]    DATE: 2022  TIME: 10:57 AM    Assessment and Plan     Diagnoses and all orders for this visit:    Migraine with aura and without status migrainosus, not intractable  -     propranolol (INDERAL LA) 80 mg 24 hr capsule; Take 1 capsule (80 mg total) by mouth daily    Was started on migraine prophylaxis with propranolol as she has been having 8-9 headaches in a month  Essential hypertension  The propranolol will also help her blood pressure as it has been running high for a while      - Counseling Documentation: patient was counseled regarding: instructions for management, risk factor reductions, prognosis, patient and family education, risks and benefits of treatment options and importance of compliance with treatment  - Medication Side Effects: Adverse side effects of medications were reviewed with the patient/guardian today  Return to office in: as needed    Chief Complaint     Chief Complaint   Patient presents with    Follow-up     4 month        History of Present Illness     HPI   has been having migraine headaches for a while and was taking Imitrex but still gets very frequent headaches and wants to take something for prophylaxis  The following portions of the patient's history were reviewed and updated as appropriate: allergies, current medications, past family history, past medical history, past social history, past surgical history and problem list     Review of Systems     Review of Systems   Constitutional: Negative for chills, diaphoresis, fatigue and fever  HENT: Negative for congestion, ear discharge, ear pain, hearing loss, postnasal drip, rhinorrhea, sinus pressure, sinus pain, sneezing, sore throat and voice change  Eyes: Negative for pain, discharge, redness and visual disturbance     Respiratory: Negative for cough, chest tightness, shortness of breath and wheezing  Cardiovascular: Negative for chest pain, palpitations and leg swelling  Gastrointestinal: Negative for abdominal distention, abdominal pain, blood in stool, constipation, diarrhea, nausea and vomiting  Endocrine: Negative for cold intolerance, heat intolerance, polydipsia, polyphagia and polyuria  Genitourinary: Negative for dysuria, flank pain, frequency, hematuria and urgency  Musculoskeletal: Negative for arthralgias, back pain, gait problem, joint swelling, myalgias, neck pain and neck stiffness  Skin: Negative for rash  Neurological: Positive for headaches  Negative for dizziness, tremors, syncope, facial asymmetry, speech difficulty, weakness, light-headedness and numbness  Hematological: Does not bruise/bleed easily  Psychiatric/Behavioral: Negative for behavioral problems, confusion and sleep disturbance  The patient is not nervous/anxious  Active Problem List     Patient Active Problem List   Diagnosis    Family history of colon cancer    Chronic idiopathic constipation    Tetanus, diphtheria, and acellular pertussis (Tdap) vaccination declined    Influenza vaccination declined    [facility]    Migraine with aura and without status migrainosus, not intractable    Essential hypertension       Objective     /74 (BP Location: Left arm, Patient Position: Sitting, Cuff Size: Standard)   Pulse 76   Temp 98 1 °F (36 7 °C) (Tympanic)   Resp 16   Ht 5' 5" (1 651 m)   Wt 64 2 kg (141 lb 9 6 oz)   SpO2 100%   BMI 23 56 kg/m²     Physical Exam  Constitutional:       General: She is not in acute distress  Appearance: She is well-developed  She is not diaphoretic  HENT:      Head: Normocephalic and atraumatic  Right Ear: External ear normal       Left Ear: External ear normal       Nose: Nose normal    Eyes:      General: No scleral icterus  Right eye: No discharge           Left eye: No discharge  Conjunctiva/sclera: Conjunctivae normal    Neck:      Thyroid: No thyromegaly  Vascular: No JVD  Trachea: No tracheal deviation  Cardiovascular:      Rate and Rhythm: Normal rate and regular rhythm  Heart sounds: Normal heart sounds  No murmur heard  No friction rub  No gallop  Pulmonary:      Effort: Pulmonary effort is normal  No respiratory distress  Breath sounds: Normal breath sounds  No wheezing or rales  Chest:      Chest wall: No tenderness  Abdominal:      General: Bowel sounds are normal  There is no distension  Palpations: Abdomen is soft  Tenderness: There is no abdominal tenderness  There is no guarding or rebound  Musculoskeletal:         General: No tenderness  Normal range of motion  Cervical back: Normal range of motion and neck supple  Lymphadenopathy:      Cervical: No cervical adenopathy  Skin:     General: Skin is warm and dry  Findings: No erythema or rash  Neurological:      Mental Status: She is alert and oriented to person, place, and time  Cranial Nerves: No cranial nerve deficit  Motor: No abnormal muscle tone        Coordination: Coordination normal    Psychiatric:         Judgment: Judgment normal            Current Medications       Current Outpatient Medications:     Ferrous Sulfate (IRON SUPPLEMENT PO), , Disp: , Rfl:     ibuprofen (MOTRIN) 200 mg tablet, Take by mouth every 6 (six) hours as needed for mild pain, Disp: , Rfl:     Multiple Vitamin (MULTIVITAMIN) tablet, Take 1 tablet by mouth daily, Disp: , Rfl:     propranolol (INDERAL LA) 80 mg 24 hr capsule, Take 1 capsule (80 mg total) by mouth daily, Disp: 90 capsule, Rfl: 2    SUMAtriptan (IMITREX) 50 mg tablet, Take 1 tablet (50 mg total) by mouth once as needed for migraine for up to 1 dose, Disp: 9 tablet, Rfl: 0    Health Maintenance     Health Maintenance   Topic Date Due    COVID-19 Vaccine (1) 06/11/2022 (Originally 7/5/1992)  DTaP,Tdap,and Td Vaccines (1 - Tdap) 03/11/2023 (Originally 7/5/2008)    Influenza Vaccine (Season Ended) 09/01/2022    Depression Screening  03/11/2023    Annual Physical  03/11/2023    BMI: Adult  05/26/2023    Cervical Cancer Screening  09/26/2024    Colorectal Cancer Screening  01/15/2025    HIV Screening  Completed    Hepatitis C Screening  Completed    Pneumococcal Vaccine: Pediatrics (0 to 5 Years) and At-Risk Patients (6 to 59 Years)  Aged Out    HIB Vaccine  Aged Out    Hepatitis B Vaccine  Aged Out    IPV Vaccine  Aged Out    Hepatitis A Vaccine  Aged Out    Meningococcal ACWY Vaccine  Aged Out    HPV Vaccine  Aged Out       There is no immunization history on file for this patient        Melany Kyle MD  [address] [facility] of BEHAVIORAL MEDICINE AT [facility]

## 2022-09-15 ENCOUNTER — TELEPHONE (OUTPATIENT)
Dept: HEMATOLOGY ONCOLOGY | Facility: CLINIC | Age: 37
End: 2022-09-15

## 2022-09-15 ENCOUNTER — APPOINTMENT (OUTPATIENT)
Dept: LAB | Facility: CLINIC | Age: 37
End: 2022-09-15
Payer: COMMERCIAL

## 2022-09-15 DIAGNOSIS — D70.9 NEUTROPENIA, UNSPECIFIED TYPE (HCC): ICD-10-CM

## 2022-09-15 DIAGNOSIS — D70.9 NEUTROPENIA, UNSPECIFIED TYPE (HCC): Primary | ICD-10-CM

## 2022-09-15 LAB
BASOPHILS # BLD AUTO: 0.03 THOUSANDS/ΜL (ref 0–0.1)
BASOPHILS NFR BLD AUTO: 1 % (ref 0–1)
EOSINOPHIL # BLD AUTO: 0.13 THOUSAND/ΜL (ref 0–0.61)
EOSINOPHIL NFR BLD AUTO: 3 % (ref 0–6)
ERYTHROCYTE [DISTWIDTH] IN BLOOD BY AUTOMATED COUNT: 13.3 % (ref 11.6–15.1)
HCT VFR BLD AUTO: 37.4 % (ref 34.8–46.1)
HGB BLD-MCNC: 12 G/DL (ref 11.5–15.4)
IMM GRANULOCYTES # BLD AUTO: 0 THOUSAND/UL (ref 0–0.2)
IMM GRANULOCYTES NFR BLD AUTO: 0 % (ref 0–2)
LYMPHOCYTES # BLD AUTO: 1.89 THOUSANDS/ΜL (ref 0.6–4.47)
LYMPHOCYTES NFR BLD AUTO: 39 % (ref 14–44)
MCH RBC QN AUTO: 28 PG (ref 26.8–34.3)
MCHC RBC AUTO-ENTMCNC: 32.1 G/DL (ref 31.4–37.4)
MCV RBC AUTO: 87 FL (ref 82–98)
MONOCYTES # BLD AUTO: 0.51 THOUSAND/ΜL (ref 0.17–1.22)
MONOCYTES NFR BLD AUTO: 11 % (ref 4–12)
NEUTROPHILS # BLD AUTO: 2.32 THOUSANDS/ΜL (ref 1.85–7.62)
NEUTS SEG NFR BLD AUTO: 46 % (ref 43–75)
NRBC BLD AUTO-RTO: 0 /100 WBCS
PLATELET # BLD AUTO: 321 THOUSANDS/UL (ref 149–390)
PMV BLD AUTO: 10.9 FL (ref 8.9–12.7)
RBC # BLD AUTO: 4.29 MILLION/UL (ref 3.81–5.12)
WBC # BLD AUTO: 4.88 THOUSAND/UL (ref 4.31–10.16)

## 2022-09-15 PROCEDURE — 85025 COMPLETE CBC W/AUTO DIFF WBC: CPT

## 2022-09-15 PROCEDURE — 36415 COLL VENOUS BLD VENIPUNCTURE: CPT

## 2022-09-15 NOTE — TELEPHONE ENCOUNTER
Received vm from patient in regards to labs needed for Dr Acacia Howell  Patient to have CBCD to be done  Patient agreeable and appreciative of call    CBCD order placed

## 2022-10-19 ENCOUNTER — VBI (OUTPATIENT)
Dept: ADMINISTRATIVE | Facility: OTHER | Age: 37
End: 2022-10-19

## 2022-12-21 ENCOUNTER — VBI (OUTPATIENT)
Dept: ADMINISTRATIVE | Facility: OTHER | Age: 37
End: 2022-12-21

## 2023-03-24 DIAGNOSIS — I10 ESSENTIAL HYPERTENSION: Primary | ICD-10-CM

## 2023-03-27 ENCOUNTER — APPOINTMENT (OUTPATIENT)
Dept: LAB | Facility: CLINIC | Age: 38
End: 2023-03-27

## 2023-03-27 DIAGNOSIS — I10 ESSENTIAL HYPERTENSION: ICD-10-CM

## 2023-03-27 LAB
ALBUMIN SERPL BCP-MCNC: 4 G/DL (ref 3.5–5)
ALP SERPL-CCNC: 48 U/L (ref 46–116)
ALT SERPL W P-5'-P-CCNC: 18 U/L (ref 12–78)
ANION GAP SERPL CALCULATED.3IONS-SCNC: 1 MMOL/L (ref 4–13)
AST SERPL W P-5'-P-CCNC: 13 U/L (ref 5–45)
BASOPHILS # BLD AUTO: 0.02 THOUSANDS/ÂΜL (ref 0–0.1)
BASOPHILS NFR BLD AUTO: 1 % (ref 0–1)
BILIRUB SERPL-MCNC: 0.4 MG/DL (ref 0.2–1)
BUN SERPL-MCNC: 12 MG/DL (ref 5–25)
CALCIUM SERPL-MCNC: 9.1 MG/DL (ref 8.3–10.1)
CHLORIDE SERPL-SCNC: 108 MMOL/L (ref 96–108)
CO2 SERPL-SCNC: 28 MMOL/L (ref 21–32)
CREAT SERPL-MCNC: 0.89 MG/DL (ref 0.6–1.3)
EOSINOPHIL # BLD AUTO: 0.25 THOUSAND/ÂΜL (ref 0–0.61)
EOSINOPHIL NFR BLD AUTO: 9 % (ref 0–6)
ERYTHROCYTE [DISTWIDTH] IN BLOOD BY AUTOMATED COUNT: 13.8 % (ref 11.6–15.1)
GFR SERPL CREATININE-BSD FRML MDRD: 84 ML/MIN/1.73SQ M
GLUCOSE P FAST SERPL-MCNC: 90 MG/DL (ref 65–99)
HCT VFR BLD AUTO: 34.4 % (ref 34.8–46.1)
HGB BLD-MCNC: 10.6 G/DL (ref 11.5–15.4)
IMM GRANULOCYTES # BLD AUTO: 0.01 THOUSAND/UL (ref 0–0.2)
IMM GRANULOCYTES NFR BLD AUTO: 0 % (ref 0–2)
LYMPHOCYTES # BLD AUTO: 1.34 THOUSANDS/ÂΜL (ref 0.6–4.47)
LYMPHOCYTES NFR BLD AUTO: 49 % (ref 14–44)
MCH RBC QN AUTO: 26.8 PG (ref 26.8–34.3)
MCHC RBC AUTO-ENTMCNC: 30.8 G/DL (ref 31.4–37.4)
MCV RBC AUTO: 87 FL (ref 82–98)
MONOCYTES # BLD AUTO: 0.34 THOUSAND/ÂΜL (ref 0.17–1.22)
MONOCYTES NFR BLD AUTO: 12 % (ref 4–12)
NEUTROPHILS # BLD AUTO: 0.8 THOUSANDS/ÂΜL (ref 1.85–7.62)
NEUTS SEG NFR BLD AUTO: 29 % (ref 43–75)
NRBC BLD AUTO-RTO: 0 /100 WBCS
PLATELET # BLD AUTO: 291 THOUSANDS/UL (ref 149–390)
PMV BLD AUTO: 10.7 FL (ref 8.9–12.7)
POTASSIUM SERPL-SCNC: 4.6 MMOL/L (ref 3.5–5.3)
PROT SERPL-MCNC: 7.2 G/DL (ref 6.4–8.4)
RBC # BLD AUTO: 3.95 MILLION/UL (ref 3.81–5.12)
SODIUM SERPL-SCNC: 137 MMOL/L (ref 135–147)
TSH SERPL DL<=0.05 MIU/L-ACNC: 3.07 UIU/ML (ref 0.45–4.5)
WBC # BLD AUTO: 2.76 THOUSAND/UL (ref 4.31–10.16)

## 2023-03-30 ENCOUNTER — TELEPHONE (OUTPATIENT)
Dept: HEMATOLOGY ONCOLOGY | Facility: CLINIC | Age: 38
End: 2023-03-30

## 2023-03-30 NOTE — TELEPHONE ENCOUNTER
Scheduling Appointment SEND TO \[facility]\""    Person calling In self     If other than patient calling, are they listed on the communication consent form? self   Doctor  Alert   Location [place]   Appointment date and time 4/11 2:20pm   Reason for scheduling appointment followup appmt   Patient verbalized understanding?  yes   No show policy reviewed with patient yes

## 2023-03-31 DIAGNOSIS — D50.0 IRON DEFICIENCY ANEMIA DUE TO CHRONIC BLOOD LOSS: Primary | ICD-10-CM

## 2023-03-31 RX ORDER — SODIUM CHLORIDE 9 MG/ML
20 INJECTION, SOLUTION INTRAVENOUS ONCE
Status: CANCELLED | OUTPATIENT
Start: 2023-04-07

## 2023-04-03 ENCOUNTER — TELEPHONE (OUTPATIENT)
Dept: HEMATOLOGY ONCOLOGY | Facility: CLINIC | Age: 38
End: 2023-04-03

## 2023-04-03 NOTE — TELEPHONE ENCOUNTER
Patient is currently scheduled at [facility], after going over appointments with patient she requested if its possible to reschedule infusions to [facility]  Are we able to accommodate patient on any day in the morning at [facility] infusion? Thank you!

## 2023-04-03 NOTE — TELEPHONE ENCOUNTER
----- Message from Saleem Toth MD sent at 3/31/2023  5:20 PM EDT -----  Mamie Mcleod, could you schedule this patient for IV iron please

## 2023-04-20 ENCOUNTER — HOSPITAL ENCOUNTER (OUTPATIENT)
Dept: INFUSION CENTER | Facility: CLINIC | Age: 38
Discharge: HOME | End: 2023-04-20

## 2023-04-20 VITALS
TEMPERATURE: 97.6 F | SYSTOLIC BLOOD PRESSURE: 140 MMHG | RESPIRATION RATE: 17 BRPM | DIASTOLIC BLOOD PRESSURE: 75 MMHG | HEART RATE: 93 BPM

## 2023-04-20 DIAGNOSIS — D50.0 IRON DEFICIENCY ANEMIA DUE TO CHRONIC BLOOD LOSS: Primary | ICD-10-CM

## 2023-04-20 RX ORDER — SODIUM CHLORIDE 9 MG/ML
20 INJECTION, SOLUTION INTRAVENOUS ONCE
Status: COMPLETED | OUTPATIENT
Start: 2023-04-20 | End: 2023-04-20

## 2023-04-20 RX ORDER — SODIUM CHLORIDE 9 MG/ML
20 INJECTION, SOLUTION INTRAVENOUS ONCE
Status: CANCELLED | OUTPATIENT
Start: 2023-04-27

## 2023-04-20 RX ADMIN — IRON SUCROSE 200 MG: 20 INJECTION, SOLUTION INTRAVENOUS at 11:06

## 2023-04-20 RX ADMIN — SODIUM CHLORIDE 20 ML/HR: 9 INJECTION, SOLUTION INTRAVENOUS at 11:06

## 2023-04-27 ENCOUNTER — HOSPITAL ENCOUNTER (OUTPATIENT)
Dept: INFUSION CENTER | Facility: CLINIC | Age: 38
Discharge: HOME | End: 2023-04-27

## 2023-04-27 VITALS
DIASTOLIC BLOOD PRESSURE: 87 MMHG | TEMPERATURE: 97.3 F | HEART RATE: 96 BPM | RESPIRATION RATE: 18 BRPM | SYSTOLIC BLOOD PRESSURE: 135 MMHG

## 2023-04-27 DIAGNOSIS — D50.0 IRON DEFICIENCY ANEMIA DUE TO CHRONIC BLOOD LOSS: Primary | ICD-10-CM

## 2023-04-27 RX ORDER — SODIUM CHLORIDE 9 MG/ML
20 INJECTION, SOLUTION INTRAVENOUS ONCE
Status: COMPLETED | OUTPATIENT
Start: 2023-04-27 | End: 2023-04-27

## 2023-04-27 RX ORDER — SODIUM CHLORIDE 9 MG/ML
20 INJECTION, SOLUTION INTRAVENOUS ONCE
Status: CANCELLED | OUTPATIENT
Start: 2023-04-27

## 2023-04-27 RX ADMIN — SODIUM CHLORIDE 20 ML/HR: 0.9 INJECTION, SOLUTION INTRAVENOUS at 08:09

## 2023-04-27 RX ADMIN — IRON SUCROSE 200 MG: 20 INJECTION, SOLUTION INTRAVENOUS at 08:14

## 2023-04-27 NOTE — PROGRESS NOTES
Patient presents to infusion department for venofer infusion  Patient offers no complaints  Patient tolerated venofer infusion well  AVS declined, this is her last ordered iron infusion  Pt aware to follow up with Law Parsons in October

## 2023-05-19 ENCOUNTER — RA CDI HCC (OUTPATIENT)
Dept: OTHER | Facility: HOSPITAL | Age: 38
End: 2023-05-19

## 2023-05-19 NOTE — PROGRESS NOTES
[facility]  coding opportunities       Chart reviewed, no opportunity found: CHART REVIEWED, NO OPPORTUNITY FOUND        Patients Insurance        Commercial Insurance: [address] [place] [address]

## 2023-06-10 NOTE — PROGRESS NOTES
Pt to clinic for venofer  Pt offers no complaints today  Tolerated infusion without complications  Pt aware of next appointment  PIV removed  AVS was offered, pt declined  Pt ambulated out of clinic safely  show

## 2023-07-13 ENCOUNTER — HOSPITAL ENCOUNTER (EMERGENCY)
Facility: HOSPITAL | Age: 38
Discharge: HOME | End: 2023-07-13
Attending: EMERGENCY MEDICINE
Payer: COMMERCIAL

## 2023-07-13 VITALS
TEMPERATURE: 97.8 F | SYSTOLIC BLOOD PRESSURE: 129 MMHG | OXYGEN SATURATION: 99 % | DIASTOLIC BLOOD PRESSURE: 68 MMHG | HEART RATE: 99 BPM | RESPIRATION RATE: 18 BRPM

## 2023-07-13 DIAGNOSIS — R21 RASH: Primary | ICD-10-CM

## 2023-07-13 LAB
ALBUMIN SERPL BCP-MCNC: 4.8 G/DL (ref 3.5–5)
ALP SERPL-CCNC: 44 U/L (ref 34–104)
ALT SERPL W P-5'-P-CCNC: 10 U/L (ref 7–52)
ANION GAP SERPL CALCULATED.3IONS-SCNC: 10 MMOL/L
AST SERPL W P-5'-P-CCNC: 14 U/L (ref 13–39)
BASOPHILS # BLD AUTO: 0.01 THOUSANDS/ÂΜL (ref 0–0.1)
BASOPHILS NFR BLD AUTO: 0 % (ref 0–1)
BILIRUB SERPL-MCNC: 0.37 MG/DL (ref 0.2–1)
BUN SERPL-MCNC: 8 MG/DL (ref 5–25)
CALCIUM SERPL-MCNC: 9.6 MG/DL (ref 8.4–10.2)
CHLORIDE SERPL-SCNC: 105 MMOL/L (ref 96–108)
CO2 SERPL-SCNC: 22 MMOL/L (ref 21–32)
CREAT SERPL-MCNC: 0.98 MG/DL (ref 0.6–1.3)
EOSINOPHIL # BLD AUTO: 0.05 THOUSAND/ÂΜL (ref 0–0.61)
EOSINOPHIL NFR BLD AUTO: 1 % (ref 0–6)
ERYTHROCYTE [DISTWIDTH] IN BLOOD BY AUTOMATED COUNT: 12.6 % (ref 11.6–15.1)
GFR SERPL CREATININE-BSD FRML MDRD: 74 ML/MIN/1.73SQ M
GLUCOSE SERPL-MCNC: 115 MG/DL (ref 65–140)
HCT VFR BLD AUTO: 40.1 % (ref 34.8–46.1)
HGB BLD-MCNC: 13.4 G/DL (ref 11.5–15.4)
IMM GRANULOCYTES # BLD AUTO: 0.01 THOUSAND/UL (ref 0–0.2)
IMM GRANULOCYTES NFR BLD AUTO: 0 % (ref 0–2)
LYMPHOCYTES # BLD AUTO: 0.55 THOUSANDS/ÂΜL (ref 0.6–4.47)
LYMPHOCYTES NFR BLD AUTO: 10 % (ref 14–44)
MCH RBC QN AUTO: 29.6 PG (ref 26.8–34.3)
MCHC RBC AUTO-ENTMCNC: 33.4 G/DL (ref 31.4–37.4)
MCV RBC AUTO: 89 FL (ref 82–98)
MONOCYTES # BLD AUTO: 0.07 THOUSAND/ÂΜL (ref 0.17–1.22)
MONOCYTES NFR BLD AUTO: 1 % (ref 4–12)
NEUTROPHILS # BLD AUTO: 4.9 THOUSANDS/ÂΜL (ref 1.85–7.62)
NEUTS SEG NFR BLD AUTO: 88 % (ref 43–75)
NRBC BLD AUTO-RTO: 0 /100 WBCS
PLATELET # BLD AUTO: 304 THOUSANDS/UL (ref 149–390)
PMV BLD AUTO: 10 FL (ref 8.9–12.7)
POTASSIUM SERPL-SCNC: 3.9 MMOL/L (ref 3.5–5.3)
PROT SERPL-MCNC: 7.9 G/DL (ref 6.4–8.4)
RBC # BLD AUTO: 4.52 MILLION/UL (ref 3.81–5.12)
SODIUM SERPL-SCNC: 137 MMOL/L (ref 135–147)
WBC # BLD AUTO: 5.59 THOUSAND/UL (ref 4.31–10.16)

## 2023-07-13 PROCEDURE — 85025 COMPLETE CBC W/AUTO DIFF WBC: CPT | Performed by: EMERGENCY MEDICINE

## 2023-07-13 PROCEDURE — 80053 COMPREHEN METABOLIC PANEL: CPT | Performed by: EMERGENCY MEDICINE

## 2023-07-13 PROCEDURE — 36415 COLL VENOUS BLD VENIPUNCTURE: CPT | Performed by: EMERGENCY MEDICINE

## 2023-07-13 RX ORDER — METHYLPREDNISOLONE SODIUM SUCCINATE 125 MG/2ML
125 INJECTION, POWDER, LYOPHILIZED, FOR SOLUTION INTRAMUSCULAR; INTRAVENOUS ONCE
Status: COMPLETED | OUTPATIENT
Start: 2023-07-13 | End: 2023-07-13

## 2023-07-13 RX ADMIN — METHYLPREDNISOLONE SODIUM SUCCINATE 125 MG: 125 INJECTION, POWDER, FOR SOLUTION INTRAMUSCULAR; INTRAVENOUS at 19:47

## 2023-07-13 NOTE — Clinical Note
Alana Shaw was seen and treated in our emergency department on 7/13/2023. Diagnosis:     Julienne Nichols  may return to work on return date. She may return on this date: 07/17/2023         If you have any questions or concerns, please don't hesitate to call.       Desiree Rosso    ______________________________           _______________          _______________  Hospital Representative                              Date                                Time

## 2023-07-14 NOTE — ED PROVIDER NOTES
History  Chief Complaint   Patient presents with   • Rash     Rash on bilateral arms that started on Friday. Radiating for face on Monday, Went to Urgent care and received prednisone today. Took one dose. States that the rash is itchy. 42-year-old female presenting to the emergency department for evaluation of rash. Patient has rash on bilateral arms started on Friday, patient is being treated for poison ivy. Spread from her arms to her face, itching, nonpainful no fevers or chills. Patient is currently on steroid taper. Prior to Admission Medications   Prescriptions Last Dose Informant Patient Reported? Taking? Ferrous Sulfate (IRON SUPPLEMENT PO)   Yes No   Multiple Vitamin (MULTIVITAMIN) tablet  Self Yes No   Sig: Take 1 tablet by mouth daily   SUMAtriptan (IMITREX) 50 mg tablet   No No   Sig: Take 1 tablet (50 mg total) by mouth once as needed for migraine for up to 1 dose   ibuprofen (MOTRIN) 200 mg tablet  Self Yes No   Sig: Take by mouth every 6 (six) hours as needed for mild pain   propranolol (INDERAL LA) 80 mg 24 hr capsule   No No   Sig: Take 1 capsule (80 mg total) by mouth daily      Facility-Administered Medications: None       Past Medical History:   Diagnosis Date   • COVID-19 vaccination declined 3/11/2022   • Hypertension        Past Surgical History:   Procedure Laterality Date   • HEEL SPUR EXCISION Right 2016   • TONSILLECTOMY  1979   • TUBAL LIGATION  10/01/2018       Family History   Problem Relation Age of Onset   • Hypertension Mother    • Hypertension Brother    • Asthma Brother    • Breast cancer Maternal Grandmother    • Colon cancer Maternal Grandfather    • Colon cancer Paternal Grandmother    • Cancer Paternal Grandfather    • Cancer Paternal Aunt    • Cancer Paternal Uncle      I have reviewed and agree with the history as documented.     E-Cigarette/Vaping   • E-Cigarette Use Never User      E-Cigarette/Vaping Substances   • Nicotine No    • THC No    • CBD No    • Flavoring No    • Other No    • Unknown No      Social History     Tobacco Use   • Smoking status: Never   • Smokeless tobacco: Never   Vaping Use   • Vaping Use: Never used   Substance Use Topics   • Alcohol use: Yes     Comment: occasionally   • Drug use: Never       Review of Systems   Constitutional: Negative for appetite change, chills, fatigue and fever. HENT: Negative for sneezing and sore throat. Eyes: Negative for visual disturbance. Respiratory: Negative for cough, choking, chest tightness, shortness of breath and wheezing. Cardiovascular: Negative for chest pain and palpitations. Gastrointestinal: Negative for abdominal pain, constipation, diarrhea, nausea and vomiting. Genitourinary: Negative for difficulty urinating and dysuria. Skin: Positive for rash. Neurological: Negative for dizziness, weakness, light-headedness, numbness and headaches. All other systems reviewed and are negative. Physical Exam  Physical Exam  Vitals and nursing note reviewed. Constitutional:       General: She is not in acute distress. Appearance: She is well-developed. She is not diaphoretic. HENT:      Head: Normocephalic and atraumatic. Eyes:      Pupils: Pupils are equal, round, and reactive to light. Neck:      Vascular: No JVD. Trachea: No tracheal deviation. Cardiovascular:      Rate and Rhythm: Normal rate and regular rhythm. Heart sounds: Normal heart sounds. No murmur heard. No friction rub. No gallop. Pulmonary:      Effort: Pulmonary effort is normal. No respiratory distress. Breath sounds: Normal breath sounds. No wheezing or rales. Abdominal:      General: Bowel sounds are normal. There is no distension. Palpations: Abdomen is soft. Tenderness: There is no abdominal tenderness. There is no guarding or rebound. Skin:     General: Skin is warm and dry. Coloration: Skin is not pale.       Comments: Patient has erythematous polymorphologic vesicular rash on the arms and face most consistent with poison ivy dermatitis. Neurological:      Mental Status: She is alert and oriented to person, place, and time. Cranial Nerves: No cranial nerve deficit. Motor: No abnormal muscle tone.    Psychiatric:         Behavior: Behavior normal.         Vital Signs  ED Triage Vitals   Temperature Pulse Respirations Blood Pressure SpO2   07/13/23 1835 07/13/23 1835 07/13/23 1835 07/13/23 1835 07/13/23 1835   97.8 °F (36.6 °C) (!) 138 18 154/98 97 %      Temp src Heart Rate Source Patient Position - Orthostatic VS BP Location FiO2 (%)   -- 07/13/23 1835 07/13/23 2026 -- --    Monitor Sitting        Pain Score       --                  Vitals:    07/13/23 1835 07/13/23 2020 07/13/23 2026   BP: 154/98  129/68   Pulse: (!) 138 105 99   Patient Position - Orthostatic VS:   Sitting         Visual Acuity      ED Medications  Medications   methylPREDNISolone sodium succinate (Solu-MEDROL) injection 125 mg (125 mg Intravenous Given 7/13/23 1947)       Diagnostic Studies  Results Reviewed     Procedure Component Value Units Date/Time    Comprehensive metabolic panel [[id number]] Collected: 07/13/23 1946    Lab Status: Final result Specimen: Blood from Arm, Right Updated: 07/13/23 2019     Sodium 137 mmol/L      Potassium 3.9 mmol/L      Chloride 105 mmol/L      CO2 22 mmol/L      ANION GAP 10 mmol/L      BUN 8 mg/dL      Creatinine 0.98 mg/dL      Glucose 115 mg/dL      Calcium 9.6 mg/dL      AST 14 U/L      ALT 10 U/L      Alkaline Phosphatase 44 U/L      Total Protein 7.9 g/dL      Albumin 4.8 g/dL      Total Bilirubin 0.37 mg/dL      eGFR 74 ml/min/1.73sq m     Narrative:      [place] guidelines for Chronic Kidney Disease (CKD):   •  Stage 1 with normal or high GFR (GFR > 90 mL/min/1.73 square meters)  •  Stage 2 Mild CKD (GFR = 60-89 mL/min/1.73 square meters)  •  Stage 3A Moderate CKD (GFR = 45-59 mL/min/1.73 square meters)  •  Stage 3B Moderate CKD (GFR = 30-44 mL/min/1.73 square meters)  •  Stage 4 Severe CKD (GFR = 15-29 mL/min/1.73 square meters)  •  Stage 5 End Stage CKD (GFR <15 mL/min/1.73 square meters)  Note: GFR calculation is accurate only with a steady state creatinine    CBC and differential [[id number]]  (Abnormal) Collected: 07/13/23 1946    Lab Status: Final result Specimen: Blood from Arm, Right Updated: 07/13/23 1959     WBC 5.59 Thousand/uL      RBC 4.52 Million/uL      Hemoglobin 13.4 g/dL      Hematocrit 40.1 %      MCV 89 fL      MCH 29.6 pg      MCHC 33.4 g/dL      RDW 12.6 %      MPV 10.0 fL      Platelets 902 Thousands/uL      nRBC 0 /100 WBCs      Neutrophils Relative 88 %      Immat GRANS % 0 %      Lymphocytes Relative 10 %      Monocytes Relative 1 %      Eosinophils Relative 1 %      Basophils Relative 0 %      Neutrophils Absolute 4.90 Thousands/µL      Immature Grans Absolute 0.01 Thousand/uL      Lymphocytes Absolute 0.55 Thousands/µL      Monocytes Absolute 0.07 Thousand/µL      Eosinophils Absolute 0.05 Thousand/µL      Basophils Absolute 0.01 Thousands/µL                  No orders to display              Procedures  Procedures         ED Course                                             Medical Decision Making  44-year-old female with rash most consistent with poison ivy, she is currently on steroids recommend that she continue to taper, will give additional dose of IV Solu-Medrol here, recommend that she may also add topical calamine to steroids and antihistamines. Amount and/or Complexity of Data Reviewed  Labs: ordered. Risk  Prescription drug management.           Disposition  Final diagnoses:   Rash     Time reflects when diagnosis was documented in both MDM as applicable and the Disposition within this note     Time User Action Codes Description Comment    7/13/2023  8:20 PM Alton Crawford Rash       ED Disposition     ED Disposition   Discharge    Condition   Stable    Date/Time   Thu Jul 13, 2023  8:20 PM    [facility] discharge to home/self care. Follow-up Information    None         Discharge Medication List as of 7/13/2023  8:21 PM      CONTINUE these medications which have NOT CHANGED    Details   Ferrous Sulfate (IRON SUPPLEMENT PO) Starting Mon 4/25/2022, Historical Med      ibuprofen (MOTRIN) 200 mg tablet Take by mouth every 6 (six) hours as needed for mild pain, Historical Med      Multiple Vitamin (MULTIVITAMIN) tablet Take 1 tablet by mouth daily, Historical Med      propranolol (INDERAL LA) 80 mg 24 hr capsule Take 1 capsule (80 mg total) by mouth daily, Starting Thu 5/26/2022, Normal      SUMAtriptan (IMITREX) 50 mg tablet Take 1 tablet (50 mg total) by mouth once as needed for migraine for up to 1 dose, Starting Mon 5/2/2022, Normal             No discharge procedures on file.     PDMP Review     None          ED Provider  Electronically Signed by           Shira Jenkins MD  07/14/23 6616

## 2023-07-16 ENCOUNTER — HOSPITAL ENCOUNTER (EMERGENCY)
Facility: HOSPITAL | Age: 38
Discharge: HOME | End: 2023-07-16
Attending: EMERGENCY MEDICINE
Payer: COMMERCIAL

## 2023-07-16 VITALS
OXYGEN SATURATION: 100 % | RESPIRATION RATE: 18 BRPM | TEMPERATURE: 97.8 F | DIASTOLIC BLOOD PRESSURE: 98 MMHG | HEART RATE: 92 BPM | SYSTOLIC BLOOD PRESSURE: 163 MMHG

## 2023-07-16 DIAGNOSIS — L50.9 URTICARIA: ICD-10-CM

## 2023-07-16 DIAGNOSIS — R60.0 PERIORBITAL EDEMA OF BOTH EYES: ICD-10-CM

## 2023-07-16 DIAGNOSIS — L23.7 CONTACT DERMATITIS DUE TO POISON IVY: ICD-10-CM

## 2023-07-16 DIAGNOSIS — T78.40XA ALLERGIC REACTION, INITIAL ENCOUNTER: Primary | ICD-10-CM

## 2023-07-16 PROCEDURE — 99284 EMERGENCY DEPT VISIT MOD MDM: CPT | Performed by: EMERGENCY MEDICINE

## 2023-07-16 PROCEDURE — 99283 EMERGENCY DEPT VISIT LOW MDM: CPT

## 2023-07-16 RX ORDER — HYDROXYZINE HYDROCHLORIDE 25 MG/1
25 TABLET, FILM COATED ORAL EVERY 6 HOURS PRN
Qty: 30 TABLET | Refills: 0 | Status: SHIPPED | OUTPATIENT
Start: 2023-07-16

## 2023-07-16 RX ORDER — HYDROXYZINE HYDROCHLORIDE 25 MG/1
25 TABLET, FILM COATED ORAL ONCE
Status: COMPLETED | OUTPATIENT
Start: 2023-07-16 | End: 2023-07-16

## 2023-07-16 RX ORDER — PREDNISONE 10 MG/1
TABLET ORAL
Qty: 42 TABLET | Refills: 0 | Status: SHIPPED | OUTPATIENT
Start: 2023-07-16

## 2023-07-16 RX ORDER — PREDNISONE 20 MG/1
60 TABLET ORAL ONCE
Status: COMPLETED | OUTPATIENT
Start: 2023-07-16 | End: 2023-07-16

## 2023-07-16 RX ADMIN — HYDROXYZINE HYDROCHLORIDE 25 MG: 25 TABLET ORAL at 10:27

## 2023-07-16 RX ADMIN — PREDNISONE 60 MG: 20 TABLET ORAL at 10:27

## 2023-07-16 NOTE — ED PROVIDER NOTES
History  Chief Complaint   Patient presents with   • Eye Swelling     Pt c/o bilateral eye swelling, pt was given steroid injection on Thursday with no relief      Patient is a 45-year-old female with past medical history of hypertension, presents to the emergency department complaining of bilateral eye swelling as well as a very itchy rash. Patient states that she developed a rash last week and was prednisone and took one dose Wedneday but because her symptoms were worsening, he came to the ED Thursday and was given a shot of Solu-Medrol and was diagnosed with poison ivy. She states the rash has been persistent on both arms, hands and her right lower leg. She states yesterday she noticed some mild swelling around both of her eyes but today she woke up and there was more significant swelling and redness around the eyes. She took another prednisone yesterday. She has no known allergies but does report history of seasonal allergies. Denies any new drugs, new soaps, detergents, linens or other household products. She denies any ocular pain, visual disturbance, associated fevers or chills, mouth or tongue swelling, trouble swallowing or feeling like her throat is closing, difficulty breathing, wheezing, stridor, chest pain, palpitations, nausea, vomiting, diarrhea, abdominal pain, urinary symptoms, extremity weakness or paresthesia or other focal neurologic deficits. History provided by:  Patient and medical records   used: No        Prior to Admission Medications   Prescriptions Last Dose Informant Patient Reported? Taking?    Ferrous Sulfate (IRON SUPPLEMENT PO)   Yes No   Multiple Vitamin (MULTIVITAMIN) tablet  Self Yes No   Sig: Take 1 tablet by mouth daily   SUMAtriptan (IMITREX) 50 mg tablet   No No   Sig: Take 1 tablet (50 mg total) by mouth once as needed for migraine for up to 1 dose   ibuprofen (MOTRIN) 200 mg tablet  Self Yes No   Sig: Take by mouth every 6 (six) hours as needed for mild pain   propranolol (INDERAL LA) 80 mg 24 hr capsule   No No   Sig: Take 1 capsule (80 mg total) by mouth daily      Facility-Administered Medications: None       Past Medical History:   Diagnosis Date   • COVID-19 vaccination declined 3/11/2022   • Hypertension        Past Surgical History:   Procedure Laterality Date   • HEEL SPUR EXCISION Right 2016   • TONSILLECTOMY  1979   • TUBAL LIGATION  10/01/2018       Family History   Problem Relation Age of Onset   • Hypertension Mother    • Hypertension Brother    • Asthma Brother    • Breast cancer Maternal Grandmother    • Colon cancer Maternal Grandfather    • Colon cancer Paternal Grandmother    • Cancer Paternal Grandfather    • Cancer Paternal Aunt    • Cancer Paternal Uncle      I have reviewed and agree with the history as documented. E-Cigarette/Vaping   • E-Cigarette Use Never User      E-Cigarette/Vaping Substances   • Nicotine No    • THC No    • CBD No    • Flavoring No    • Other No    • Unknown No      Social History     Tobacco Use   • Smoking status: Never   • Smokeless tobacco: Never   Vaping Use   • Vaping Use: Never used   Substance Use Topics   • Alcohol use: Yes     Comment: occasionally   • Drug use: Never       Review of Systems   Constitutional: Negative for chills and fever. HENT: Positive for facial swelling. Negative for congestion, ear pain, rhinorrhea, sore throat, trouble swallowing and voice change. Eyes: Negative for pain, discharge, itching and visual disturbance. +Bilateral periorbital swelling and itching. Respiratory: Negative for cough, chest tightness, shortness of breath, wheezing and stridor. Cardiovascular: Negative for chest pain, palpitations and leg swelling. Gastrointestinal: Negative for abdominal pain, diarrhea, nausea and vomiting. Genitourinary: Negative for dysuria, flank pain, frequency and hematuria. Musculoskeletal: Negative for back pain and neck pain. Skin: Positive for rash. Negative for color change, pallor and wound. Allergic/Immunologic: Negative for immunocompromised state. Neurological: Negative for dizziness, syncope, weakness, light-headedness, numbness and headaches. Hematological: Negative for adenopathy. Psychiatric/Behavioral: Negative for confusion and decreased concentration. All other systems reviewed and are negative. Physical Exam  Physical Exam  Vitals and nursing note reviewed. Constitutional:       General: She is not in acute distress. Appearance: Normal appearance. She is well-developed. She is not ill-appearing, toxic-appearing or diaphoretic. HENT:      Head: Normocephalic and atraumatic. Right Ear: External ear normal.      Left Ear: External ear normal.      Nose: Nose normal.      Mouth/Throat:      Mouth: Mucous membranes are moist.      Pharynx: Oropharynx is clear. No oropharyngeal exudate. Comments: No posterior oropharyngeal, tongue or lip swelling. Patient speaking and handling oral secretions without difficulty. Eyes:      Extraocular Movements: Extraocular movements intact. Conjunctiva/sclera: Conjunctivae normal.      Pupils: Pupils are equal, round, and reactive to light. Comments: Bilateral periorbital edema and blotchy erythema. No eye pain with extraocular movements. Neck:      Vascular: No JVD. Cardiovascular:      Rate and Rhythm: Normal rate and regular rhythm. Pulses: Normal pulses. Heart sounds: Normal heart sounds. No murmur heard. No friction rub. No gallop. Pulmonary:      Effort: Pulmonary effort is normal. No respiratory distress. Breath sounds: Normal breath sounds. No wheezing, rhonchi or rales. Abdominal:      General: There is no distension. Palpations: Abdomen is soft. Tenderness: There is no abdominal tenderness. There is no guarding or rebound. Musculoskeletal:         General: No swelling or tenderness. Normal range of motion.       Cervical back: Normal range of motion and neck supple. No rigidity. Skin:     General: Skin is warm and dry. Coloration: Skin is not pale. Findings: Erythema and rash present. Comments: Vesicular rash on an erythematous base in a linear distribution over the dorsum of bilateral hands and forearms. There is similar rash on the right lower leg anteriorly. There is also blotchy erythematous slightly raised macular rash on bilateral arms consistent with urticarial rash. Neurological:      General: No focal deficit present. Mental Status: She is alert and oriented to person, place, and time. Sensory: No sensory deficit. Motor: No weakness. Psychiatric:         Mood and Affect: Mood normal.         Behavior: Behavior normal.         Vital Signs  ED Triage Vitals [07/16/23 0956]   Temperature Pulse Respirations Blood Pressure SpO2   97.8 °F (36.6 °C) (!) 121 18 163/98 97 %      Temp Source Heart Rate Source Patient Position - Orthostatic VS BP Location FiO2 (%)   Temporal Monitor Lying Left arm --      Pain Score       --         Vitals:    07/16/23 0956 07/16/23 1036   BP: 163/98    BP Location: Left arm    Pulse: (!) 121 92   Resp: 18    Temp: 97.8 °F (36.6 °C)    TempSrc: Temporal    SpO2: 97% 100%       Visual Acuity      ED Medications  Medications   hydrOXYzine HCL (ATARAX) tablet 25 mg (25 mg Oral Given 7/16/23 1027)   predniSONE tablet 60 mg (60 mg Oral Given 7/16/23 1027)       Diagnostic Studies  Results Reviewed     None                 No orders to display              Procedures  Procedures         ED Course                     Medical Decision Making  72-year-old female presents to the ED for worsening bilateral upper extremity and now right lower extremity rash, very itchy in nature. She also presents with new periorbital edema and erythema. Suspect acute allergic reaction in addition to poison ivy. Very low clinical suspicion for periorbital or orbital cellulitis.   Will restart patient on prednisone taper and start patient on hydroxyzine as needed for itching. I advised her to use the antihistamine every 6 hours for at least 24 to 48 hours and then after that she may use it as needed. I did discuss using Benadryl as well which may have more of an effect but could cause sedation and patient drove to the ED with her children so will hold off on giving Benadryl now. I advised her to use over-the-counter hydrocortisone 1% cream on her bilateral arms and legs as needed for rash and itching. I advised her to return immediately if she develops fevers, eye pain or change in vision, any intraoral involvement/swelling, sensation of throat tightness or closing, difficulty breathing or any other concerning symptoms. Risk  Prescription drug management. Disposition  Final diagnoses: Allergic reaction, initial encounter   Urticaria   Periorbital edema of both eyes   Contact dermatitis due to poison ivy     Time reflects when diagnosis was documented in both MDM as applicable and the Disposition within this note     Time User Action Codes Description Comment    7/16/2023 10:21 AM Javid Showers E Add [T78.40XA] Allergic reaction, initial encounter     7/16/2023 10:21 AM Javid Showers E Add [L50.9] Urticaria     7/16/2023 10:21 AM Javid Showers E Add [R60.0] Periorbital edema of both eyes     7/16/2023 10:22 AM Javid Showers E Add [L23.7] Contact dermatitis due to poison ivy       ED Disposition     ED Disposition   Discharge    Condition   Stable    Date/Time   Sun Jul 16, 2023 10:21 AM    Comment   Ena Bonilla discharge to home/self care.                Follow-up Information     Follow up With Specialties Details Why Contact Info Additional Mone Rojas MD Internal Medicine Schedule an appointment as soon as possible for a visit   [address] [place] [address] Allergy, Asthma & Immunology Allergy Schedule an appointment as soon as possible for a visit   [address] [facility] [address] [facility] Allergy, Asthma & Immunology, [address] [facility], [facility], [id number] Emergency Department Emergency Medicine Go to  If symptoms worsen [address] [facility] Emergency Department, [place], [place], [address]          Patient's Medications   Discharge Prescriptions    HYDROXYZINE HCL (ATARAX) 25 MG TABLET    Take 1 tablet (25 mg total) by mouth every 6 (six) hours as needed for itching or allergies       Start Date: 7/16/2023 End Date: --       Order Dose: 25 mg       Quantity: 30 tablet    Refills: 0    PREDNISONE 10 MG TABLET    Take 60mg PO daily x 2 days, then 50mg daily x 2 days, then 40mg daily x 2 days, then 30mg daily x 2 days, then 20mg x 2 days, then 10mg daily x 2 days       Start Date: 7/16/2023 End Date: --       Order Dose: --       Quantity: 42 tablet    Refills: 0       No discharge procedures on file.     PDMP Review     None          ED Provider  Electronically Signed by           Lena Boston DO  07/16/23 1038

## 2023-08-22 DIAGNOSIS — L23.7 CONTACT DERMATITIS DUE TO POISON IVY: ICD-10-CM

## 2023-08-22 DIAGNOSIS — T78.40XA ALLERGIC REACTION, INITIAL ENCOUNTER: ICD-10-CM

## 2023-08-22 DIAGNOSIS — L50.9 URTICARIA: ICD-10-CM

## 2023-08-22 DIAGNOSIS — R60.0 PERIORBITAL EDEMA OF BOTH EYES: ICD-10-CM

## 2023-08-23 RX ORDER — HYDROXYZINE HYDROCHLORIDE 25 MG/1
25 TABLET, FILM COATED ORAL EVERY 6 HOURS PRN
Qty: 30 TABLET | Refills: 3 | Status: SHIPPED | OUTPATIENT
Start: 2023-08-23

## 2023-09-13 ENCOUNTER — TELEPHONE (OUTPATIENT)
Dept: HEMATOLOGY ONCOLOGY | Facility: CLINIC | Age: 38
End: 2023-09-13

## 2023-09-13 NOTE — TELEPHONE ENCOUNTER
Appointment Change  Cancel, Reschedule, Change to Virtual      Who are you speaking with? self   If it is not the patient, is the caller listed on the communication consent form? self   Which provider is the appointment scheduled with? Billandar   When was the original appointment scheduled? Please list date and time 10/18   At which location is the appointment scheduled to take place? [facility]   Was the appointment rescheduled? Was the appointment changed from an in person visit to a virtual visit? If so, please list the details of the change. Yes, 11/30 3:20pm   What is the reason for the appointment change? Provider away       Was STAR transport scheduled? no   Does STAR transport need to be scheduled for the new visit (if applicable) no   Does the patient need an infusion appointment rescheduled? no   Does the patient have an upcoming infusion appointment scheduled? If so, when? no   Is the patient undergoing chemotherapy? no   For appointments cancelled with less than 24 hours:  Was the no-show policy reviewed?  yes

## 2023-09-14 ENCOUNTER — LAB (OUTPATIENT)
Age: 38
End: 2023-09-14
Payer: COMMERCIAL

## 2023-09-14 DIAGNOSIS — D50.0 IRON DEFICIENCY ANEMIA DUE TO CHRONIC BLOOD LOSS: ICD-10-CM

## 2023-09-14 LAB
BASOPHILS # BLD AUTO: 0.01 THOUSANDS/ÂΜL (ref 0–0.1)
BASOPHILS NFR BLD AUTO: 0 % (ref 0–1)
EOSINOPHIL # BLD AUTO: 0.16 THOUSAND/ÂΜL (ref 0–0.61)
EOSINOPHIL NFR BLD AUTO: 6 % (ref 0–6)
ERYTHROCYTE [DISTWIDTH] IN BLOOD BY AUTOMATED COUNT: 12.1 % (ref 11.6–15.1)
FERRITIN SERPL-MCNC: 12 NG/ML (ref 11–307)
HCT VFR BLD AUTO: 38.6 % (ref 34.8–46.1)
HGB BLD-MCNC: 13.2 G/DL (ref 11.5–15.4)
IMM GRANULOCYTES # BLD AUTO: 0.01 THOUSAND/UL (ref 0–0.2)
IMM GRANULOCYTES NFR BLD AUTO: 0 % (ref 0–2)
IRON SATN MFR SERPL: 39 % (ref 15–50)
IRON SERPL-MCNC: 128 UG/DL (ref 50–212)
LYMPHOCYTES # BLD AUTO: 1.32 THOUSANDS/ÂΜL (ref 0.6–4.47)
LYMPHOCYTES NFR BLD AUTO: 53 % (ref 14–44)
MCH RBC QN AUTO: 31 PG (ref 26.8–34.3)
MCHC RBC AUTO-ENTMCNC: 34.2 G/DL (ref 31.4–37.4)
MCV RBC AUTO: 91 FL (ref 82–98)
MONOCYTES # BLD AUTO: 0.29 THOUSAND/ÂΜL (ref 0.17–1.22)
MONOCYTES NFR BLD AUTO: 11 % (ref 4–12)
NEUTROPHILS # BLD AUTO: 0.78 THOUSANDS/ÂΜL (ref 1.85–7.62)
NEUTS SEG NFR BLD AUTO: 30 % (ref 43–75)
NRBC BLD AUTO-RTO: 0 /100 WBCS
PLATELET # BLD AUTO: 302 THOUSANDS/UL (ref 149–390)
PMV BLD AUTO: 10.1 FL (ref 8.9–12.7)
RBC # BLD AUTO: 4.26 MILLION/UL (ref 3.81–5.12)
TIBC SERPL-MCNC: 332 UG/DL (ref 250–450)
UIBC SERPL-MCNC: 204 UG/DL (ref 155–355)
WBC # BLD AUTO: 2.57 THOUSAND/UL (ref 4.31–10.16)

## 2023-09-14 PROCEDURE — 36415 COLL VENOUS BLD VENIPUNCTURE: CPT

## 2023-09-14 PROCEDURE — 82728 ASSAY OF FERRITIN: CPT

## 2023-09-14 PROCEDURE — 85025 COMPLETE CBC W/AUTO DIFF WBC: CPT

## 2023-09-14 PROCEDURE — 83550 IRON BINDING TEST: CPT

## 2023-09-14 PROCEDURE — 83540 ASSAY OF IRON: CPT

## 2023-09-15 ENCOUNTER — TELEPHONE (OUTPATIENT)
Dept: HEMATOLOGY ONCOLOGY | Facility: CLINIC | Age: 38
End: 2023-09-15

## 2023-09-15 NOTE — TELEPHONE ENCOUNTER
Called patient to reschedule appt for an earlier day and time . Left message with [facility] tel number .  Per Dr Jayy Birmingham OK to see Loc[facility]

## 2023-09-15 NOTE — TELEPHONE ENCOUNTER
Appointment Change  Cancel, Reschedule, Change to Virtual      Who are you speaking with? self   If it is not the patient, is the caller listed on the communication consent form? self   Which provider is the appointment scheduled with? miguelito   When was the original appointment scheduled? Please list date and time 11/30   At which location is the appointment scheduled to take place? [facility]   Was the appointment rescheduled? Was the appointment changed from an in person visit to a virtual visit? If so, please list the details of the change. Yes, 9/20 2:40pm Yassine   What is the reason for the appointment change? sooner       Was STAR transport scheduled? no   Does STAR transport need to be scheduled for the new visit (if applicable) no   Does the patient need an infusion appointment rescheduled? no   Does the patient have an upcoming infusion appointment scheduled? If so, when? no   Is the patient undergoing chemotherapy? no   For appointments cancelled with less than 24 hours:  Was the no-show policy reviewed?  yes

## 2023-09-20 ENCOUNTER — OFFICE VISIT (OUTPATIENT)
Dept: HEMATOLOGY ONCOLOGY | Facility: CLINIC | Age: 38
End: 2023-09-20
Payer: COMMERCIAL

## 2023-09-20 ENCOUNTER — TELEPHONE (OUTPATIENT)
Dept: HEMATOLOGY ONCOLOGY | Facility: CLINIC | Age: 38
End: 2023-09-20

## 2023-09-20 VITALS
OXYGEN SATURATION: 98 % | SYSTOLIC BLOOD PRESSURE: 126 MMHG | WEIGHT: 141 LBS | TEMPERATURE: 98.6 F | HEART RATE: 82 BPM | HEIGHT: 66 IN | DIASTOLIC BLOOD PRESSURE: 82 MMHG | RESPIRATION RATE: 16 BRPM | BODY MASS INDEX: 22.66 KG/M2

## 2023-09-20 DIAGNOSIS — D70.9 NEUTROPENIA, UNSPECIFIED TYPE (HCC): Primary | ICD-10-CM

## 2023-09-20 PROCEDURE — 99214 OFFICE O/P EST MOD 30 MIN: CPT | Performed by: INTERNAL MEDICINE

## 2023-09-20 NOTE — PROGRESS NOTES
[address] H[facility]ATOLOGY ONCOLOGY SPECIALISTS [facility]  [place] [address]    XJZIGBE PVGRLWSH  1985      PRIMARY HEMATOLOGIC/ONCOLOGIC DIAGNOSIS:  Neutropenia        INTERIM HISTORY:  The patient presents for a 6m follow-up. She has bene fatigued for 2 weeks. Felt hot at night. Reports night sweats-- not drenching. During the day she is fine. No rash. Had lower abdominal cramping (LLQ) for 3 days. She stayed in bed. Pain was 8/10. Radiated to left hip.      PAST MEDICAL,PAST SURGICAL, FAMILY AND SOCIAL HISTORY:    Patient Active Problem List   Diagnosis    Family history of colon cancer    Chronic idiopathic constipation    Tetanus, diphtheria, and acellular pertussis (Tdap) vaccination declined    Influenza vaccination declined    Neutropenia (HCC)    Migraine with aura and without status migrainosus, not intractable    Essential hypertension    Iron deficiency anemia due to chronic blood loss     Past Medical History:   Diagnosis Date    COVID-19 vaccination declined 3/11/2022    Hypertension      Past Surgical History:   Procedure Laterality Date    HEEL SPUR EXCISION Right 2016    TONSILLECTOMY  1979    TUBAL LIGATION  10/01/2018     Family History   Problem Relation Age of Onset    Hypertension Mother     Hypertension Brother     Asthma Brother     Breast cancer Maternal Grandmother     Colon cancer Maternal Grandfather     Colon cancer Paternal Grandmother     Cancer Paternal Grandfather     Cancer Paternal Aunt     Cancer Paternal Uncle      Social History     Socioeconomic History    Marital status: Single     Spouse name: Not on file    Number of children: Not on file    Years of education: Not on file    Highest education level: Not on file   Occupational History    Not on file   Tobacco Use    Smoking status: Never    Smokeless tobacco: Never   Vaping Use    Vaping Use: Never used   Substance and Sexual Activity    Alcohol use: Yes     Comment: occasionally    Drug use: Never    Sexual activity: Not Currently     Partners: Male   Other Topics Concern    Not on file   Social History Narrative    Not on file     Social Determinants of Health     Financial Resource Strain: Not on file   Food Insecurity: Not on file   Transportation Needs: Not on file   Physical Activity: Inactive (8/26/2020)    Exercise Vital Sign     Days of Exercise per Week: 0 days     Minutes of Exercise per Session: 0 min   Stress: No Stress Concern Present (8/26/2020)    [address] [facility]     Feeling of Stress : Not at all   Social Connections: Not on file   Intimate Partner Violence: Not on file   Housing Stability: Not on file       Current Outpatient Medications:     Ferrous Sulfate (IRON SUPPLEMENT PO), , Disp: , Rfl:     hydrOXYzine HCL (ATARAX) 25 mg tablet, Take 1 tablet (25 mg total) by mouth every 6 (six) hours as needed for itching or allergies, Disp: 30 tablet, Rfl: 3    ibuprofen (MOTRIN) 200 mg tablet, Take by mouth every 6 (six) hours as needed for mild pain, Disp: , Rfl:     Multiple Vitamin (MULTIVITAMIN) tablet, Take 1 tablet by mouth daily, Disp: , Rfl:     predniSONE 10 mg tablet, Take 60mg PO daily x 2 days, then 50mg daily x 2 days, then 40mg daily x 2 days, then 30mg daily x 2 days, then 20mg x 2 days, then 10mg daily x 2 days, Disp: 42 tablet, Rfl: 0    propranolol (INDERAL LA) 80 mg 24 hr capsule, Take 1 capsule (80 mg total) by mouth daily, Disp: 90 capsule, Rfl: 2    SUMAtriptan (IMITREX) 50 mg tablet, Take 1 tablet (50 mg total) by mouth once as needed for migraine for up to 1 dose, Disp: 9 tablet, Rfl: 0  No Known Allergies  There were no vitals filed for this visit. ROS:  CONSTITUTIONAL:  No fever. No chills. No dizziness. No weakness. EYES:  No pain, erythema, or discharge. No blurring of vision. ENT:  No sore throat, URI symptoms. No epistaxis. No tinnitus. CARDIOVASCULAR:  No chest pain.  No palpitations. No lower extremity edema. RESPIRATORY:  No shortness of breath, cough, pain with respiration, pleuritic chest pain. No hemoptysis. No dyspnea. No paroxysmal nocturnal dyspnea. GASTROINTESTINAL:  Normal appetite. No nausea, vomiting, diarrhea. No pain. No bloating. No melena. GENITOURINARY:  No frequency, urgency, nocturia. No hematuria or dysuria. MUSCULOSKELETAL:  No arthralgias or myalgias. INTEGUMENTARY:  No swelling. No bruising. No contusions. No abrasions. No lymphangitis. NEUROLOGIC:  No headache. No neck pain. No numbness or tingling of the extremities. No weakness. PSYCHIATRIC:  No confusion. ENDOCRINE:  No fatigue. No weakness. No history of thyroid, diabetes or adrenal problems. HEMATOLOGICAL:  No bleeding. No petechiae. No bruising.     PHYSICAL EXAM:    GENERAL: AAO x 3  HEENT: AT,NC  CVS: S1S2 RRR  LUNGS: CTA b/l  ABD: NT,ND, +BS  EXTR: no edema  NEURO: CN II-XII grossly intact    LABS:  I have reviewed pertinent labs:  CBC:   Lab Results   Component Value Date    WBC 2.57 (L) 09/14/2023    RBC 4.26 09/14/2023    HGB 13.2 09/14/2023    HCT 38.6 09/14/2023    MCV 91 09/14/2023     09/14/2023    MCH 31.0 09/14/2023    MCHC 34.2 09/14/2023    RDW 12.1 09/14/2023    MPV 10.1 09/14/2023    NEUTROABS 0.78 (L) 09/14/2023     CMP:   Lab Results   Component Value Date    SODIUM 137 07/13/2023    K 3.9 07/13/2023     07/13/2023    CO2 22 07/13/2023    AGAP 10 07/13/2023    BUN 8 07/13/2023    CREATININE 0.98 07/13/2023    GLUC 115 07/13/2023    GLUF 90 03/27/2023    CALCIUM 9.6 07/13/2023    AST 14 07/13/2023    ALT 10 07/13/2023    ALKPHOS 44 07/13/2023    TP 7.9 07/13/2023    ALB 4.8 07/13/2023    TBILI 0.37 07/13/2023    EGFR 74 07/13/2023     Liver Enzymes:   Lab Results   Component Value Date    AST 14 07/13/2023    ALT 10 07/13/2023    ALKPHOS 44 07/13/2023    TP 7.9 07/13/2023    ALB 4.8 07/13/2023    TBILI 0.37 07/13/2023     Vitamin B12   Lab Results   Component Value Date    [id number] 409 04/14/2022     Iron Study   Lab Results   Component Value Date    RETIC 54,500 04/14/2022    RETICCTPCT 1.25 04/14/2022    FERRITIN 12 09/14/2023    CONCFE 39 09/14/2023    TIBC 332 09/14/2023    IRON 128 09/14/2023     Folate No results found for: "FOLATE"  Magnesium No results found for: "MG"  Phosphorus No results found for: "PHOS"  Coagulation Panel No results found for: "DDIMER", "PROTIME", "INR", "PTT"    IMAGING:  No results found. I reviewed the above laboratory and imaging data. ASSESSMENT/PLAN:  Neutropenia. 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 Flow cytometry was unrevealing ( performed 2022). Peripheral smear--mild leukopenia with absolute neutropenia with normal morphology. Occasional reactive and large granular lymphocytes were present. Platelets were normal in number with occasional large forms. The findings were favored to be reactive. Differential included a medication effect, an infectious or autoimmune process among others. The patient is not on medications that are associated w/ development of neutropenia. ANCA panel was negative. Hepatitis panel non-reactive. HIV testing negative. MMA normal at 137. Zinc normal at 77. B12 409. Copper normal at 114. DS DNA negative. RF negative. LINWOOD negative. Discussed the above with the patient. Discussed the utility of a BMBx vs continued monitoring. She elected to undergo BMBx. IR referral placed. GUS. Will obtain iron panel. LLQ abdominal pain radiating to left hip. Obtain US abdomen.

## 2023-09-20 NOTE — TELEPHONE ENCOUNTER
I called IR to schedule bone marrow biopsy. Per IR they will reach out either next Tuesday (09/26/2023) or Wednesday (09/27/23) once the schedule becomes available for November for the [address] [place] [address]. Patient was offered 10/31/2023 but was not able to make that date.

## 2023-09-25 ENCOUNTER — APPOINTMENT (OUTPATIENT)
Age: 38
End: 2023-09-25
Payer: COMMERCIAL

## 2023-09-25 DIAGNOSIS — D70.9 NEUTROPENIA, UNSPECIFIED TYPE (HCC): ICD-10-CM

## 2023-09-25 PROCEDURE — 73502 X-RAY EXAM HIP UNI 2-3 VIEWS: CPT

## 2023-09-29 ENCOUNTER — HOSPITAL ENCOUNTER (OUTPATIENT)
Dept: ULTRASOUND IMAGING | Facility: CLINIC | Age: 38
Discharge: HOME | End: 2023-09-29
Attending: INTERNAL MEDICINE
Payer: COMMERCIAL

## 2023-09-29 DIAGNOSIS — D70.9 NEUTROPENIA, UNSPECIFIED TYPE (HCC): ICD-10-CM

## 2023-09-29 PROCEDURE — 76700 US EXAM ABDOM COMPLETE: CPT

## 2023-11-01 ENCOUNTER — HOSPITAL ENCOUNTER (OUTPATIENT)
Dept: CT IMAGING | Facility: HOSPITAL | Age: 38
Discharge: HOME | End: 2023-11-01
Attending: INTERNAL MEDICINE
Payer: COMMERCIAL

## 2023-11-01 VITALS
WEIGHT: 141.98 LBS | TEMPERATURE: 98.4 F | HEART RATE: 92 BPM | DIASTOLIC BLOOD PRESSURE: 83 MMHG | RESPIRATION RATE: 16 BRPM | HEIGHT: 66 IN | SYSTOLIC BLOOD PRESSURE: 140 MMHG | BODY MASS INDEX: 22.82 KG/M2 | OXYGEN SATURATION: 100 %

## 2023-11-01 DIAGNOSIS — D70.9 NEUTROPENIA, UNSPECIFIED TYPE (HCC): ICD-10-CM

## 2023-11-01 LAB
ANION GAP SERPL CALCULATED.3IONS-SCNC: 4 MMOL/L
BUN SERPL-MCNC: 11 MG/DL (ref 5–25)
CALCIUM SERPL-MCNC: 9 MG/DL (ref 8.4–10.2)
CHLORIDE SERPL-SCNC: 107 MMOL/L (ref 96–108)
CO2 SERPL-SCNC: 27 MMOL/L (ref 21–32)
CREAT SERPL-MCNC: 0.84 MG/DL (ref 0.6–1.3)
ERYTHROCYTE [DISTWIDTH] IN BLOOD BY AUTOMATED COUNT: 11.7 % (ref 11.6–15.1)
EXT PREGNANCY TEST URINE: NEGATIVE
EXT. CONTROL: NORMAL
GFR SERPL CREATININE-BSD FRML MDRD: 89 ML/MIN/1.73SQ M
GLUCOSE P FAST SERPL-MCNC: 97 MG/DL (ref 65–99)
GLUCOSE SERPL-MCNC: 97 MG/DL (ref 65–140)
HCT VFR BLD AUTO: 39.3 % (ref 34.8–46.1)
HGB BLD-MCNC: 13.4 G/DL (ref 11.5–15.4)
IMM EOSINOPHIL NFR BLD MANUAL: 3 % (ref 0–6)
LYMPHOCYTES NFR BLD: 35 % (ref 14–44)
MCH RBC QN AUTO: 30.2 PG (ref 26.8–34.3)
MCHC RBC AUTO-ENTMCNC: 34.1 G/DL (ref 31.4–37.4)
MCV RBC AUTO: 89 FL (ref 82–98)
MONOCYTES NFR BLD AUTO: 3 % (ref 4–12)
NEUTS SEG NFR BLD AUTO: 59 % (ref 45–77)
NRBC BLD AUTO-RTO: 0 /100 WBCS
PLATELET # BLD AUTO: 321 THOUSANDS/UL (ref 149–390)
PLATELET BLD QL SMEAR: ADEQUATE
PMV BLD AUTO: 9.6 FL (ref 8.9–12.7)
POTASSIUM SERPL-SCNC: 3.7 MMOL/L (ref 3.5–5.3)
RBC # BLD AUTO: 4.43 MILLION/UL (ref 3.81–5.12)
RBC MORPH BLD: NORMAL
SODIUM SERPL-SCNC: 138 MMOL/L (ref 135–147)
TOTAL CELLS COUNTED SPEC: 100
WBC # BLD AUTO: 4.47 THOUSAND/UL (ref 4.31–10.16)

## 2023-11-01 PROCEDURE — 88341 IMHCHEM/IMCYTCHM EA ADD ANTB: CPT | Performed by: PATHOLOGY

## 2023-11-01 PROCEDURE — 38222 DX BONE MARROW BX & ASPIR: CPT | Performed by: RADIOLOGY

## 2023-11-01 PROCEDURE — 99152 MOD SED SAME PHYS/QHP 5/>YRS: CPT

## 2023-11-01 PROCEDURE — 77012 CT SCAN FOR NEEDLE BIOPSY: CPT

## 2023-11-01 PROCEDURE — 81450 HL NEO GSAP 5-50DNA/DNA&RNA: CPT | Performed by: INTERNAL MEDICINE

## 2023-11-01 PROCEDURE — 88342 IMHCHEM/IMCYTCHM 1ST ANTB: CPT | Performed by: PATHOLOGY

## 2023-11-01 PROCEDURE — 88360 TUMOR IMMUNOHISTOCHEM/MANUAL: CPT | Performed by: PATHOLOGY

## 2023-11-01 PROCEDURE — 88305 TISSUE EXAM BY PATHOLOGIST: CPT | Performed by: PATHOLOGY

## 2023-11-01 PROCEDURE — 88311 DECALCIFY TISSUE: CPT | Performed by: PATHOLOGY

## 2023-11-01 PROCEDURE — 85007 BL SMEAR W/DIFF WBC COUNT: CPT | Performed by: RADIOLOGY

## 2023-11-01 PROCEDURE — 88313 SPECIAL STAINS GROUP 2: CPT | Performed by: PATHOLOGY

## 2023-11-01 PROCEDURE — 88184 FLOWCYTOMETRY/ TC 1 MARKER: CPT | Performed by: INTERNAL MEDICINE

## 2023-11-01 PROCEDURE — 80048 BASIC METABOLIC PNL TOTAL CA: CPT | Performed by: RADIOLOGY

## 2023-11-01 PROCEDURE — 77012 CT SCAN FOR NEEDLE BIOPSY: CPT | Performed by: RADIOLOGY

## 2023-11-01 PROCEDURE — C1830 POWER BONE MARROW BX NEEDLE: HCPCS

## 2023-11-01 PROCEDURE — 88185 FLOWCYTOMETRY/TC ADD-ON: CPT

## 2023-11-01 PROCEDURE — 38221 DX BONE MARROW BIOPSIES: CPT

## 2023-11-01 PROCEDURE — 81025 URINE PREGNANCY TEST: CPT | Performed by: STUDENT IN AN ORGANIZED HEALTH CARE EDUCATION/TRAINING PROGRAM

## 2023-11-01 PROCEDURE — 85097 BONE MARROW INTERPRETATION: CPT | Performed by: PATHOLOGY

## 2023-11-01 RX ORDER — LIDOCAINE HYDROCHLORIDE 10 MG/ML
INJECTION, SOLUTION EPIDURAL; INFILTRATION; INTRACAUDAL; PERINEURAL AS NEEDED
Status: COMPLETED | OUTPATIENT
Start: 2023-11-01 | End: 2023-11-01

## 2023-11-01 RX ORDER — SODIUM CHLORIDE 9 MG/ML
75 INJECTION, SOLUTION INTRAVENOUS CONTINUOUS
Status: DISCONTINUED | OUTPATIENT
Start: 2023-11-01 | End: 2023-11-05 | Stop reason: HOSPADM

## 2023-11-01 RX ORDER — MIDAZOLAM HYDROCHLORIDE 2 MG/2ML
INJECTION, SOLUTION INTRAMUSCULAR; INTRAVENOUS AS NEEDED
Status: COMPLETED | OUTPATIENT
Start: 2023-11-01 | End: 2023-11-01

## 2023-11-01 RX ORDER — FENTANYL CITRATE 50 UG/ML
INJECTION, SOLUTION INTRAMUSCULAR; INTRAVENOUS AS NEEDED
Status: COMPLETED | OUTPATIENT
Start: 2023-11-01 | End: 2023-11-01

## 2023-11-01 RX ADMIN — LIDOCAINE HYDROCHLORIDE 5 ML: 10 INJECTION, SOLUTION EPIDURAL; INFILTRATION; INTRACAUDAL; PERINEURAL at 10:38

## 2023-11-01 RX ADMIN — MIDAZOLAM HYDROCHLORIDE 1 MG: 1 INJECTION, SOLUTION INTRAMUSCULAR; INTRAVENOUS at 10:43

## 2023-11-01 RX ADMIN — MIDAZOLAM HYDROCHLORIDE 1 MG: 1 INJECTION, SOLUTION INTRAMUSCULAR; INTRAVENOUS at 10:39

## 2023-11-01 RX ADMIN — FENTANYL CITRATE 50 MCG: 50 INJECTION, SOLUTION INTRAMUSCULAR; INTRAVENOUS at 10:43

## 2023-11-01 RX ADMIN — FENTANYL CITRATE 50 MCG: 50 INJECTION, SOLUTION INTRAMUSCULAR; INTRAVENOUS at 10:39

## 2023-11-01 NOTE — H&P
Interventional Radiology Preprocedure Note    History/Indication for procedure:   Abdifatah Elder is a 41 y.o. female with neutropenia. Patient presents for bone marrow biopsy    Relevant past medical history:    Past Medical History:   Diagnosis Date    COVID-19 vaccination declined 3/11/2022    Hypertension      Patient Active Problem List   Diagnosis    Family history of colon cancer    Chronic idiopathic constipation    Tetanus, diphtheria, and acellular pertussis (Tdap) vaccination declined    Influenza vaccination declined    Neutropenia ([address])    Migraine with aura and without status migrainosus, not intractable    Essential hypertension    Iron deficiency anemia due to chronic blood loss       /87   Pulse 98   Temp 97.5 °F (36.4 °C) (Temporal)   Resp 16   Ht 5' 6" (1.676 m)   Wt 64.4 kg (141 lb 15.6 oz)   LMP 10/26/2023   SpO2 100%   BMI 22.92 kg/m²     Medications:    Inpatient Medications:     Scheduled Medications:  Current Facility-Administered Medications   Medication Dose Route Frequency Provider Last Rate    sodium chloride  75 mL/hr Intravenous Continuous Candelario Bergeron MD         Infusions:  sodium chloride, 75 mL/hr        PRN:      Outpatient Medications:  Current Outpatient Medications on File Prior to Encounter   Medication Sig Dispense Refill    Ferrous Sulfate (IRON SUPPLEMENT PO)       hydrOXYzine HCL (ATARAX) 25 mg tablet Take 1 tablet (25 mg total) by mouth every 6 (six) hours as needed for itching or allergies 30 tablet 3    ibuprofen (MOTRIN) 200 mg tablet Take by mouth every 6 (six) hours as needed for mild pain      Multiple Vitamin (MULTIVITAMIN) tablet Take 1 tablet by mouth daily      propranolol (INDERAL LA) 80 mg 24 hr capsule Take 1 capsule (80 mg total) by mouth daily 90 capsule 2     No current facility-administered medications on file prior to encounter.        No Known Allergies    Anticoagulants: none    ASA classification: ASA 3 - Patient with moderate systemic disease with functional limitations    Airway Assessment: II (hard and soft palate, upper portion of tonsils anduvula visible)    Relevant family history: None    Relevant review of systems: None    Prior sedation/anesthesia: yes    Can the patient lie flat? Yes     Does patient have sleep apnea? No    If yes, does patient use CPAP? not applicable    NPO Status: yes    Labs:   CBC with diff:   Lab Results   Component Value Date    WBC 4.47 11/01/2023    HGB 13.4 11/01/2023    HCT 39.3 11/01/2023    MCV 89 11/01/2023     11/01/2023    RBC 4.43 11/01/2023    MCH 30.2 11/01/2023    MCHC 34.1 11/01/2023    RDW 11.7 11/01/2023    MPV 9.6 11/01/2023    NRBC 0 11/01/2023     BMP/CMP:  Lab Results   Component Value Date    K 3.7 11/01/2023     11/01/2023    CO2 27 11/01/2023    BUN 11 11/01/2023    CREATININE 0.84 11/01/2023    CALCIUM 9.0 11/01/2023    AST 14 07/13/2023    ALT 10 07/13/2023    ALKPHOS 44 07/13/2023    EGFR 89 11/01/2023   ,     Coags: No results found for: "PT", "PTT", "INR",          Relevant imaging studies:   None    Directed physical examination:  No apparent distress  AOx3  Normal respiratory effort  Regular rate and rhythm    Assessment/Plan:   Bone marrow biopsy    Sedation/Anesthesia plan: Moderate sedation will be used as needed for procedure.     Consent with alternatives to the procedure, risks and benefits have been explained and discussed with the patient/patient's family: yes

## 2023-11-03 LAB — SCAN RESULT: NORMAL

## 2023-11-06 LAB — MISCELLANEOUS LAB TEST RESULT: NORMAL

## 2023-11-07 PROCEDURE — 88305 TISSUE EXAM BY PATHOLOGIST: CPT | Performed by: PATHOLOGY

## 2023-11-07 PROCEDURE — 88311 DECALCIFY TISSUE: CPT | Performed by: PATHOLOGY

## 2023-11-07 PROCEDURE — 85097 BONE MARROW INTERPRETATION: CPT | Performed by: PATHOLOGY

## 2023-11-07 PROCEDURE — 88342 IMHCHEM/IMCYTCHM 1ST ANTB: CPT | Performed by: PATHOLOGY

## 2023-11-07 PROCEDURE — 88313 SPECIAL STAINS GROUP 2: CPT | Performed by: PATHOLOGY

## 2023-11-07 PROCEDURE — 85060 BLOOD SMEAR INTERPRETATION: CPT | Performed by: PATHOLOGY

## 2023-11-07 PROCEDURE — 88341 IMHCHEM/IMCYTCHM EA ADD ANTB: CPT | Performed by: PATHOLOGY

## 2023-11-15 ENCOUNTER — OFFICE VISIT (OUTPATIENT)
Dept: HEMATOLOGY ONCOLOGY | Facility: CLINIC | Age: 38
End: 2023-11-15
Payer: COMMERCIAL

## 2023-11-15 VITALS
SYSTOLIC BLOOD PRESSURE: 132 MMHG | DIASTOLIC BLOOD PRESSURE: 72 MMHG | OXYGEN SATURATION: 99 % | TEMPERATURE: 99.2 F | WEIGHT: 140 LBS | HEIGHT: 66 IN | HEART RATE: 78 BPM | RESPIRATION RATE: 16 BRPM | BODY MASS INDEX: 22.5 KG/M2

## 2023-11-15 DIAGNOSIS — D70.9 NEUTROPENIA, UNSPECIFIED TYPE (HCC): Primary | ICD-10-CM

## 2023-11-15 DIAGNOSIS — D50.0 IRON DEFICIENCY ANEMIA DUE TO CHRONIC BLOOD LOSS: ICD-10-CM

## 2023-11-15 PROCEDURE — 99213 OFFICE O/P EST LOW 20 MIN: CPT | Performed by: INTERNAL MEDICINE

## 2023-11-15 NOTE — PROGRESS NOTES
Ivon NOYOLA  [facility] HEMATOLOGY ONCOLOGY SPECIALISTS [address] [facility]  [address] [facility] [employer]  [address] [facility] [address]    Ludy Seed  1985      PRIMARY HEMATOLOGIC/ONCOLOGIC DIAGNOSIS:  Neutropenia    PATHOLOGY:  Case Report   Surgical Pathology Report                         Case: [id number]                                   Authorizing Provider:  Paco Hanson MD      Collected:           11/01/2023 1100               Ordering Location:     [facility] Received:            11/01/2023 1106                                      CT Scan                                                                       Pathologist:           Bethel Puente MD                                                           Specimens:   A) - Iliac Crest, Right, core                                                                        B) - Iliac Crest, Right, clot                                                                        C) - Iliac Crest, Right, slide                                                            Final Diagnosis   A -C. Bone marrow, right iliac crest, biopsy and aspirate:  -Cellular bone marrow with maturing trilineage myelopoiesis and slightly left shifted granulocyte lineage without significant features of atypia/dysplasia or increased blasts, favor reactive (see note). -Negative for features of abnormal lymphoid or plasma cell population  -No significant stainable storage iron and extremely limited sample. -Mildly increased reticulin fibers without overt fibrosis. -Negative for collagen fibrosis, granulomata, vasculitis, necrosis. Electronically signed by Bethel Puente MD on 11/7/2023 at  2:14 PM   Microscopic Description  [facility]   Bone marrow aspirate smears: The aspirate smears are suboptimal for evaluation secondary to low cellularity, hemodilution, and lack of significant particles/spicules.  Cellularity is composed of limited maturing trilineage hematopoiesis with a myeloid to erythroid ratio of approximately 3:1. Myelopoiesis: Myeloid precursors show normal morphology and distribution without distinct features of atypia/dysplasia or increased blasts (myeloblasts= less than 5%). Erythropoiesis: Erythroid precursors show normal morphology and maturation without distinct features of atypia/dysplasia  Megakaryocytes: No significant megakaryocytes are seen in a limited aspirate  Lymphocytes: Lymphocytes are not increased  Plasma cells: Plasma cells are not increased     Peripheral blood smear review shows normal white blood cells without features of atypia/dysplasia or circulating blasts. Red blood cells show no significant diagnostic morphologic abnormality. Platelets show normal quantity and morphology without features of significant satellitosis or clumping. Bone marrow core biopsy and clot sections are adequate for evaluation. The marrow cellularity is normal for patient age (60-70%). Myelopoiesis: Myeloid precursors show normal distribution and slightly left shifted appearing maturation with no distinct features of increased blasts by H&E stain  Erythropoiesis: Erythroid precursors show orderly maturation and normal distribution  Megakaryocytes: Megakaryocytes are present with normal appearing morphology and distribution  Lymphocytes: Lymphocytes are not increased  Plasma cells: Plasma cells are not increased     PAS stain performed on the core biopsy shows maturing trilineage hematopoiesis with left shifted appearing granulocyte lineage  Iron stain performed on the core biopsy, clot, and aspirate shows no significant stainable storage iron and no ring sideroblasts in an extremely limited sample with a suboptimal aspirate  Reticulin stain performed on the core biopsy shows mildly increased reticulin fibers (grade 1 of 3).   Immunohistochemical stains performed with appropriate controls show:  CD34 shows scattered myeloblasts without significant quantitative increase in patchy distribution (less than 5% overall)   highlights scattered immature myeloid and erythroid precursors within the interstitium  CD61 highlights scattered megakaryocytes with normal appearing morphology and distribution  CD3 highlights scattered reactive appearing interstitial T cells  CD20 highlights rare scattered reactive appearing interstitial B cells   highlights scattered plasma cells with normal appearing distribution and quantity (less than 5% overall)       INTERIM HISTORY:  9/20/23: The patient presents for a 6m follow-up. She has bene fatigued for 2 weeks. Felt hot at night. Reports night sweats-- not drenching. During the day she is fine. No rash. Had lower abdominal cramping (LLQ) for 3 days. She stayed in bed. Pain was 8/10. Radiated to left hip.   11/15/23: The patient presents after undergoing BMBx. No complaints. No night sweats. No abdominal cramping.      PAST MEDICAL,PAST SURGICAL, FAMILY AND SOCIAL HISTORY:    Patient Active Problem List   Diagnosis    Family history of colon cancer    Chronic idiopathic constipation    Tetanus, diphtheria, and acellular pertussis (Tdap) vaccination declined    Influenza vaccination declined    Neutropenia (HCC)    Migraine with aura and without status migrainosus, not intractable    Essential hypertension    Iron deficiency anemia due to chronic blood loss     Past Medical History:   Diagnosis Date    COVID-19 vaccination declined 3/11/2022    Hypertension      Past Surgical History:   Procedure Laterality Date    HEEL SPUR EXCISION Right 2016    IR BIOPSY BONE MARROW  11/1/2023    TONSILLECTOMY  1979    TUBAL LIGATION  10/01/2018     Family History   Problem Relation Age of Onset    Hypertension Mother     Hypertension Brother     Asthma Brother     Breast cancer Maternal Grandmother     Colon cancer Maternal Grandfather     Colon cancer Paternal Grandmother     Cancer Paternal Grandfather     Cancer Paternal Aunt     Cancer Paternal Uncle      Social History     Socioeconomic History    Marital status: Single     Spouse name: Not on file    Number of children: Not on file    Years of education: Not on file    Highest education level: Not on file   Occupational History    Not on file   Tobacco Use    Smoking status: Never    Smokeless tobacco: Never   Vaping Use    Vaping Use: Never used   Substance and Sexual Activity    Alcohol use: Yes     Comment: occasionally    Drug use: Never    Sexual activity: Not Currently     Partners: Male   Other Topics Concern    Not on file   Social History Narrative    Not on file     Social Determinants of Health     Financial Resource Strain: Not on file   Food Insecurity: Not on file   Transportation Needs: Not on file   Physical Activity: Inactive (8/26/2020)    Exercise Vital Sign     Days of Exercise per Week: 0 days     Minutes of Exercise per Session: 0 min   Stress: No Stress Concern Present (8/26/2020)    [address] [facility]     Feeling of Stress : Not at all   Social Connections: Not on file   Intimate Partner Violence: Not on file   Housing Stability: Not on file       Current Outpatient Medications:     Ferrous Sulfate (IRON SUPPLEMENT PO), , Disp: , Rfl:     hydrOXYzine HCL (ATARAX) 25 mg tablet, Take 1 tablet (25 mg total) by mouth every 6 (six) hours as needed for itching or allergies, Disp: 30 tablet, Rfl: 3    ibuprofen (MOTRIN) 200 mg tablet, Take by mouth every 6 (six) hours as needed for mild pain, Disp: , Rfl:     Multiple Vitamin (MULTIVITAMIN) tablet, Take 1 tablet by mouth daily, Disp: , Rfl:     propranolol (INDERAL LA) 80 mg 24 hr capsule, Take 1 capsule (80 mg total) by mouth daily, Disp: 90 capsule, Rfl: 2  No Known Allergies  Vitals:    11/15/23 0810   BP: 132/72   Pulse: 78   Resp: 16   Temp: 99.2 °F (37.3 °C)   SpO2: 99%       ROS:  CONSTITUTIONAL:  No fever. No chills. No dizziness. No weakness.   EYES:  No pain, erythema, or discharge. No blurring of vision. ENT:  No sore throat, URI symptoms. No epistaxis. No tinnitus. CARDIOVASCULAR:  No chest pain. No palpitations. No lower extremity edema. RESPIRATORY:  No shortness of breath, cough, pain with respiration, pleuritic chest pain. No hemoptysis. No dyspnea. No paroxysmal nocturnal dyspnea. GASTROINTESTINAL:  Normal appetite. No nausea, vomiting, diarrhea. No pain. No bloating. No melena. GENITOURINARY:  No frequency, urgency, nocturia. No hematuria or dysuria. MUSCULOSKELETAL:  No arthralgias or myalgias. INTEGUMENTARY:  No swelling. No bruising. No contusions. No abrasions. No lymphangitis. NEUROLOGIC:  No headache. No neck pain. No numbness or tingling of the extremities. No weakness. PSYCHIATRIC:  No confusion. ENDOCRINE:  No fatigue. No weakness. No history of thyroid, diabetes or adrenal problems. HEMATOLOGICAL:  No bleeding. No petechiae. No bruising.     PHYSICAL EXAM:    GENERAL: AAO x 3  HEENT: AT,NC  CVS: S1S2 RRR  LUNGS: CTA b/l  ABD: NT,ND, +BS  EXTR: no edema  NEURO: CN II-XII grossly intact    LABS:  I have reviewed pertinent labs:  CBC:   Lab Results   Component Value Date    WBC 4.47 11/01/2023    RBC 4.43 11/01/2023    HGB 13.4 11/01/2023    HCT 39.3 11/01/2023    MCV 89 11/01/2023     11/01/2023    MCH 30.2 11/01/2023    MCHC 34.1 11/01/2023    RDW 11.7 11/01/2023    MPV 9.6 11/01/2023    NEUTROABS 0.78 (L) 09/14/2023     CMP:   Lab Results   Component Value Date    SODIUM 138 11/01/2023    K 3.7 11/01/2023     11/01/2023    CO2 27 11/01/2023    AGAP 4 11/01/2023    BUN 11 11/01/2023    CREATININE 0.84 11/01/2023    GLUC 97 11/01/2023    GLUF 97 11/01/2023    CALCIUM 9.0 11/01/2023    AST 14 07/13/2023    ALT 10 07/13/2023    ALKPHOS 44 07/13/2023    TP 7.9 07/13/2023    ALB 4.8 07/13/2023    TBILI 0.37 07/13/2023    EGFR 89 11/01/2023     Liver Enzymes:   Lab Results   Component Value Date    AST 14 07/13/2023    ALT 10 07/13/2023    ALKPHOS 44 07/13/2023    TP 7.9 07/13/2023    ALB 4.8 07/13/2023    TBILI 0.37 07/13/2023     Vitamin B12   Lab Results   Component Value Date    [id number] 409 04/14/2022     Iron Study   Lab Results   Component Value Date    RETIC 54,500 04/14/2022    RETICCTPCT 1.25 04/14/2022    FERRITIN 12 09/14/2023    CONCFE 39 09/14/2023    TIBC 332 09/14/2023    IRON 128 09/14/2023     Folate No results found for: "FOLATE"  Magnesium No results found for: "MG"  Phosphorus No results found for: "PHOS"  Coagulation Panel No results found for: "DDIMER", "PROTIME", "INR", "PTT"    IMAGING:  No results found. I reviewed the above laboratory and imaging data. ASSESSMENT/PLAN:  Neutropenia. 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 Flow cytometry was unrevealing ( performed 2022). Peripheral smear--mild leukopenia with absolute neutropenia with normal morphology. Occasional reactive and large granular lymphocytes were present. Platelets were normal in number with occasional large forms. The findings were favored to be reactive. Differential included a medication effect, an infectious or autoimmune process among others. The patient is not on medications that are associated w/ development of neutropenia. ANCA panel was negative. Hepatitis panel non-reactive. HIV testing negative. MMA normal at 137. Zinc normal at 77. B12 409. Copper normal at 114. DS DNA negative. RF negative. LINWOOD negative. S/p BMBx-- unrevealing. CBC Q6m. F/u in 1 year. GUS. Iron panel WNL. Labs Q6m. LLQ abdominal pain radiating to left hip. US abdomen-- unrevealing.

## 2023-12-11 ENCOUNTER — OFFICE VISIT (OUTPATIENT)
Age: 38
End: 2023-12-11
Payer: COMMERCIAL

## 2023-12-11 VITALS
TEMPERATURE: 97.7 F | RESPIRATION RATE: 18 BRPM | WEIGHT: 146.2 LBS | HEART RATE: 84 BPM | HEIGHT: 66 IN | OXYGEN SATURATION: 100 % | DIASTOLIC BLOOD PRESSURE: 80 MMHG | BODY MASS INDEX: 23.5 KG/M2 | SYSTOLIC BLOOD PRESSURE: 124 MMHG

## 2023-12-11 DIAGNOSIS — Z00.00 ANNUAL PHYSICAL EXAM: Primary | ICD-10-CM

## 2023-12-11 PROBLEM — K59.04 CHRONIC IDIOPATHIC CONSTIPATION: Status: RESOLVED | Noted: 2019-12-04 | Resolved: 2023-12-11

## 2023-12-11 PROCEDURE — 99395 PREV VISIT EST AGE 18-39: CPT | Performed by: INTERNAL MEDICINE

## 2023-12-11 NOTE — PATIENT INSTRUCTIONS
Wellness Visit for Adults   AMBULATORY CARE:   A wellness visit  is when you see your healthcare provider to get screened for health problems. Your healthcare provider will also give you advice on how to stay healthy. Write down your questions so you remember to ask them. Ask your healthcare provider how often you should have a wellness visit. What happens at a wellness visit:  Your healthcare provider will ask about your health, and your family history of health problems. This includes high blood pressure, heart disease, and cancer. He or she will ask if you have symptoms that concern you, if you smoke, and about your mood. You may also be asked about your intake of medicines, supplements, food, and alcohol. Any of the following may be done: Your weight  will be checked. Your height may also be checked so your body mass index (BMI) can be calculated. Your BMI shows if you are at a healthy weight. Your blood pressure  and heart rate will be checked. Your temperature may also be checked. Blood and urine tests  may be done. Blood tests may be done to check your cholesterol levels. Abnormal cholesterol levels increase your risk for heart disease and stroke. You may also need a blood or urine test to check for diabetes if you are at increased risk. Urine tests may be done to look for signs of an infection or kidney disease. A physical exam  includes checking your heartbeat and lungs with a stethoscope. Your healthcare provider may also check your skin to look for sun damage. Screening tests  may be recommended. A screening test is done to check for diseases that may not cause symptoms. The screening tests you may need depend on your age, gender, family history, and lifestyle habits. For example, colorectal screening may be recommended if you are 48years old or older. Screening tests you need if you are a woman:   A Pap smear  is used to screen for cervical cancer.  Pap smears are usually done every 3 to 5 years depending on your age. You may need them more often if you have had abnormal Pap smear test results in the past. Ask your healthcare provider how often you should have a Pap smear. A mammogram  is an x-ray of your breasts to screen for breast cancer. Experts recommend mammograms every 2 years starting at age 48 years. You may need a mammogram at age 52 years or younger if you have an increased risk for breast cancer. Talk to your healthcare provider about when you should start having mammograms and how often you need them. Vaccines you may need:   Get an influenza vaccine  every year. The influenza vaccine protects you from the flu. Several types of viruses cause the flu. The viruses change over time, so new vaccines are made each year. Get a tetanus-diphtheria (Td) booster vaccine  every 10 years. This vaccine protects you against tetanus and diphtheria. Tetanus is a severe infection that may cause painful muscle spasms and lockjaw. Diphtheria is a severe bacterial infection that causes a thick covering in the back of your mouth and throat. Get a human papillomavirus (HPV) vaccine  if you are female and aged 23 to 32 or male 23 to 24 and never received it. This vaccine protects you from HPV infection. HPV is the most common infection spread by sexual contact. HPV may also cause vaginal, penile, and anal cancers. Get a pneumococcal vaccine  if you are aged 72 years or older. The pneumococcal vaccine is an injection given to protect you from pneumococcal disease. Pneumococcal disease is an infection caused by pneumococcal bacteria. The infection may cause pneumonia, meningitis, or an ear infection. Get a shingles vaccine  if you are 60 or older, even if you have had shingles before. The shingles vaccine is an injection to protect you from the varicella-zoster virus. This is the same virus that causes chickenpox.  Shingles is a painful rash that develops in people who had chickenpox or have been exposed to the virus. How to eat healthy:  My Plate is a model for planning healthy meals. It shows the types and amounts of foods that should go on your plate. Fruits and vegetables make up about half of your plate, and grains and protein make up the other half. A serving of dairy is included on the side of your plate. The amount of calories and serving sizes you need depends on your age, gender, weight, and height. Examples of healthy foods are listed below:  Eat a variety of vegetables  such as dark green, red, and orange vegetables. You can also include canned vegetables low in sodium (salt) and frozen vegetables without added butter or sauces. Eat a variety of fresh fruits , canned fruit in 100% juice, frozen fruit, and dried fruit. Include whole grains. At least half of the grains you eat should be whole grains. Examples include whole-wheat bread, wheat pasta, brown rice, and whole-grain cereals such as oatmeal.    Eat a variety of protein foods such as seafood (fish and shellfish), lean meat, and poultry without skin (turkey and chicken). Examples of lean meats include pork leg, shoulder, or tenderloin, and beef round, sirloin, tenderloin, and extra lean ground beef. Other protein foods include eggs and egg substitutes, beans, peas, soy products, nuts, and seeds. Choose low-fat dairy products such as skim or 1% milk or low-fat yogurt, cheese, and cottage cheese. Limit unhealthy fats  such as butter, hard margarine, and shortening. Exercise:  Exercise at least 30 minutes per day on most days of the week. Some examples of exercise include walking, biking, dancing, and swimming. You can also fit in more physical activity by taking the stairs instead of the elevator or parking farther away from stores. Include muscle strengthening activities 2 days each week. Regular exercise provides many health benefits.  It helps you manage your weight, and decreases your risk for type 2 diabetes, heart disease, stroke, and high blood pressure. Exercise can also help improve your mood. Ask your healthcare provider about the best exercise plan for you. General health and safety guidelines:   Do not smoke. Nicotine and other chemicals in cigarettes and cigars can cause lung damage. Ask your healthcare provider for information if you currently smoke and need help to quit. E-cigarettes or smokeless tobacco still contain nicotine. Talk to your healthcare provider before you use these products. Limit alcohol. A drink of alcohol is 12 ounces of beer, 5 ounces of wine, or 1½ ounces of liquor. Lose weight, if needed. Being overweight increases your risk of certain health conditions. These include heart disease, high blood pressure, type 2 diabetes, and certain types of cancer. Protect your skin. Do not sunbathe or use tanning beds. Use sunscreen with a SPF 15 or higher. Apply sunscreen at least 15 minutes before you go outside. Reapply sunscreen every 2 hours. Wear protective clothing, hats, and sunglasses when you are outside. Drive safely. Always wear your seatbelt. Make sure everyone in your car wears a seatbelt. A seatbelt can save your life if you are in an accident. Do not use your cell phone when you are driving. This could distract you and cause an accident. Pull over if you need to make a call or send a text message. Practice safe sex. Use latex condoms if are sexually active and have more than one partner. Your healthcare provider may recommend screening tests for sexually transmitted infections (STIs). Wear helmets, lifejackets, and protective gear. Always wear a helmet when you ride a bike or motorcycle, go skiing, or play sports that could cause a head injury. Wear protective equipment when you play sports. Wear a lifejacket when you are on a boat or doing water sports.     © Copyright [employer] 2023 Information is for End User's use only and may not be sold, redistributed or otherwise used for commercial purposes. The above information is an  only. It is not intended as medical advice for individual conditions or treatments. Talk to your doctor, nurse or pharmacist before following any medical regimen to see if it is safe and effective for you.

## 2023-12-11 NOTE — PROGRESS NOTES
[facility] [place]    NAME: Joan Rodriguez  AGE: 41 y.o. SEX: female  : 1985     DATE: 2023     Assessment and Plan:     Problem List Items Addressed This Visit    None  Visit Diagnoses       Encounter for wellness examination in adult    -  Primary            Immunizations and preventive care screenings were discussed with patient today. Appropriate education was printed on patient's after visit summary. Counseling:  Alcohol/drug use: discussed moderation in alcohol intake, the recommendations for healthy alcohol use, and avoidance of illicit drug use. Dental Health: discussed importance of regular tooth brushing, flossing, and dental visits. Injury prevention: discussed safety/seat belts, safety helmets, smoke detectors, carbon dioxide detectors, and smoking near bedding or upholstery. Sexual health: discussed sexually transmitted diseases, partner selection, use of condoms, avoidance of unintended pregnancy, and contraceptive alternatives. Exercise: the importance of regular exercise/physical activity was discussed. Recommend exercise 3-5 times per week for at least 30 minutes. Depression Screening and Follow-up Plan: Patient was screened for depression during today's encounter. They screened negative with a PHQ-2 score of 0. No follow-ups on file. Chief Complaint:     Chief Complaint   Patient presents with    Annual Exam      History of Present Illness:     Adult Annual Physical   Patient here for a comprehensive physical exam. The patient reports no problems. Diet and Physical Activity  Diet/Nutrition: well balanced diet. Exercise: walking.       Depression Screening  PHQ-2/9 Depression Screening    Little interest or pleasure in doing things: 0 - not at all  Feeling down, depressed, or hopeless: 0 - not at all  PHQ-2 Score: 0  PHQ-2 Interpretation: Negative depression screen       General Health  Sleep: sleeps well. Hearing: normal - bilateral.  Vision: no vision problems. Dental: regular dental visits. /GYN Health  Follows with gynecology? yes   Last menstrual period: 11/21/23  Contraceptive method:  tubal ligation . History of STDs?: no.     Advanced Care Planning  Do you have an advanced directive? no  Do you have a durable medical power of ? no     Review of Systems:     Review of Systems   Constitutional:  Negative for chills, diaphoresis, fatigue and fever. HENT:  Negative for congestion, ear discharge, ear pain, hearing loss, postnasal drip, rhinorrhea, sinus pressure, sinus pain, sneezing, sore throat and voice change. Eyes:  Negative for pain, discharge, redness and visual disturbance. Respiratory:  Negative for cough, chest tightness, shortness of breath and wheezing. Cardiovascular:  Negative for chest pain, palpitations and leg swelling. Gastrointestinal:  Negative for abdominal distention, abdominal pain, blood in stool, constipation, diarrhea, nausea and vomiting. Endocrine: Negative for cold intolerance, heat intolerance, polydipsia, polyphagia and polyuria. Genitourinary:  Negative for dysuria, flank pain, frequency, hematuria and urgency. Musculoskeletal:  Negative for arthralgias, back pain, gait problem, joint swelling, myalgias, neck pain and neck stiffness. Skin:  Negative for rash. Neurological:  Negative for dizziness, tremors, syncope, facial asymmetry, speech difficulty, weakness, light-headedness, numbness and headaches. Hematological:  Does not bruise/bleed easily. Psychiatric/Behavioral:  Negative for behavioral problems, confusion and sleep disturbance. The patient is not nervous/anxious.        Past Medical History:     Past Medical History:   Diagnosis Date    COVID-19 vaccination declined 3/11/2022    Hypertension       Past Surgical History:     Past Surgical History:   Procedure Laterality Date    HEEL SPUR EXCISION Right 2016    IR BIOPSY BONE MARROW  11/1/2023    TONSILLECTOMY  1979    TUBAL LIGATION  10/01/2018      Social History:     Social History     Socioeconomic History    Marital status: Single     Spouse name: None    Number of children: None    Years of education: None    Highest education level: None   Occupational History    None   Tobacco Use    Smoking status: Never    Smokeless tobacco: Never   Vaping Use    Vaping Use: Never used   Substance and Sexual Activity    Alcohol use: Yes     Comment: occasionally    Drug use: Never    Sexual activity: Not Currently     Partners: Male   Other Topics Concern    None   Social History Narrative    None     Social Determinants of Health     Financial Resource Strain: Not on file   Food Insecurity: Not on file   Transportation Needs: Not on file   Physical Activity: Inactive (8/26/2020)    Exercise Vital Sign     Days of Exercise per Week: 0 days     Minutes of Exercise per Session: 0 min   Stress: No Stress Concern Present (8/26/2020)    [address] [place] [address]     Feeling of Stress : Not at all   Social Connections: Not on file   Intimate Partner Violence: Not on file   Housing Stability: Not on file      Family History:     Family History   Problem Relation Age of Onset    Hypertension Mother     Hypertension Brother     Asthma Brother     Breast cancer Maternal Grandmother     Colon cancer Maternal Grandfather     Colon cancer Paternal Grandmother     Cancer Paternal Grandfather     Cancer Paternal Aunt     Cancer Paternal Uncle       Current Medications:     Current Outpatient Medications   Medication Sig Dispense Refill    Ferrous Sulfate (IRON SUPPLEMENT PO)       hydrOXYzine HCL (ATARAX) 25 mg tablet Take 1 tablet (25 mg total) by mouth every 6 (six) hours as needed for itching or allergies 30 tablet 3    ibuprofen (MOTRIN) 200 mg tablet Take by mouth every 6 (six) hours as needed for mild pain      Multiple Vitamin (MULTIVITAMIN) tablet Take 1 tablet by mouth daily      propranolol (INDERAL LA) 80 mg 24 hr capsule Take 1 capsule (80 mg total) by mouth daily 90 capsule 2     No current facility-administered medications for this visit. Allergies:     No Known Allergies   Physical Exam:     /82 (BP Location: Left arm, Patient Position: Sitting, Cuff Size: Standard)   Pulse 84   Temp 97.7 °F (36.5 °C) (Tympanic)   Resp 18   Ht 5' 6" (1.676 m)   Wt 66.3 kg (146 lb 3.2 oz)   SpO2 100%   BMI 23.60 kg/m²     Physical Exam  Constitutional:       General: She is not in acute distress. Appearance: She is well-developed. She is not diaphoretic. HENT:      Head: Normocephalic and atraumatic. Right Ear: External ear normal.      Left Ear: External ear normal.      Nose: Nose normal.   Eyes:      General: No scleral icterus. Right eye: No discharge. Left eye: No discharge. Conjunctiva/sclera: Conjunctivae normal.   Cardiovascular:      Rate and Rhythm: Normal rate and regular rhythm. Heart sounds: Normal heart sounds. No murmur heard. No friction rub. No gallop. Pulmonary:      Effort: Pulmonary effort is normal. No respiratory distress. Breath sounds: Normal breath sounds. No wheezing or rales. Abdominal:      General: Bowel sounds are normal. There is no distension. Palpations: Abdomen is soft. Tenderness: There is no abdominal tenderness. There is no guarding or rebound. Musculoskeletal:         General: No tenderness. Skin:     General: Skin is warm and dry. Findings: No erythema or rash. Neurological:      Mental Status: She is alert and oriented to person, place, and time. Cranial Nerves: No cranial nerve deficit. Sensory: No sensory deficit. Motor: No abnormal muscle tone.    Psychiatric:         Behavior: Behavior normal.          Shanti Diamond MD   [address]

## 2024-01-04 DIAGNOSIS — G43.109 MIGRAINE WITH AURA AND WITHOUT STATUS MIGRAINOSUS, NOT INTRACTABLE: ICD-10-CM

## 2024-01-04 RX ORDER — PROPRANOLOL HYDROCHLORIDE 80 MG/1
80 CAPSULE, EXTENDED RELEASE ORAL DAILY
Qty: 90 CAPSULE | Refills: 0 | Status: SHIPPED | OUTPATIENT
Start: 2024-01-04

## 2024-02-22 DIAGNOSIS — L50.9 URTICARIA: ICD-10-CM

## 2024-02-22 DIAGNOSIS — L23.7 CONTACT DERMATITIS DUE TO POISON IVY: ICD-10-CM

## 2024-02-22 DIAGNOSIS — T78.40XA ALLERGIC REACTION, INITIAL ENCOUNTER: ICD-10-CM

## 2024-02-22 DIAGNOSIS — R60.0 PERIORBITAL EDEMA OF BOTH EYES: ICD-10-CM

## 2024-02-22 RX ORDER — HYDROXYZINE HYDROCHLORIDE 25 MG/1
TABLET, FILM COATED ORAL
Qty: 30 TABLET | Refills: 3 | Status: SHIPPED | OUTPATIENT
Start: 2024-02-22

## 2024-02-27 ENCOUNTER — APPOINTMENT (OUTPATIENT)
Age: 39
End: 2024-02-27
Payer: COMMERCIAL

## 2024-02-27 LAB
BASOPHILS # BLD AUTO: 0.03 THOUSANDS/ÂΜL (ref 0–0.1)
BASOPHILS NFR BLD AUTO: 1 % (ref 0–1)
EOSINOPHIL # BLD AUTO: 0.16 THOUSAND/ÂΜL (ref 0–0.61)
EOSINOPHIL NFR BLD AUTO: 4 % (ref 0–6)
ERYTHROCYTE [DISTWIDTH] IN BLOOD BY AUTOMATED COUNT: 12.3 % (ref 11.6–15.1)
FERRITIN SERPL-MCNC: 24 NG/ML (ref 11–307)
HCT VFR BLD AUTO: 39.9 % (ref 34.8–46.1)
HGB BLD-MCNC: 13.2 G/DL (ref 11.5–15.4)
IMM GRANULOCYTES # BLD AUTO: 0 THOUSAND/UL (ref 0–0.2)
IMM GRANULOCYTES NFR BLD AUTO: 0 % (ref 0–2)
IRON SATN MFR SERPL: 24 % (ref 15–50)
IRON SERPL-MCNC: 80 UG/DL (ref 50–212)
LYMPHOCYTES # BLD AUTO: 2.03 THOUSANDS/ÂΜL (ref 0.6–4.47)
LYMPHOCYTES NFR BLD AUTO: 50 % (ref 14–44)
MCH RBC QN AUTO: 30.4 PG (ref 26.8–34.3)
MCHC RBC AUTO-ENTMCNC: 33.1 G/DL (ref 31.4–37.4)
MCV RBC AUTO: 92 FL (ref 82–98)
MONOCYTES # BLD AUTO: 0.49 THOUSAND/ÂΜL (ref 0.17–1.22)
MONOCYTES NFR BLD AUTO: 12 % (ref 4–12)
NEUTROPHILS # BLD AUTO: 1.33 THOUSANDS/ÂΜL (ref 1.85–7.62)
NEUTS SEG NFR BLD AUTO: 33 % (ref 43–75)
NRBC BLD AUTO-RTO: 0 /100 WBCS
PLATELET # BLD AUTO: 289 THOUSANDS/UL (ref 149–390)
PMV BLD AUTO: 11.2 FL (ref 8.9–12.7)
RBC # BLD AUTO: 4.34 MILLION/UL (ref 3.81–5.12)
TIBC SERPL-MCNC: 337 UG/DL (ref 250–450)
UIBC SERPL-MCNC: 257 UG/DL (ref 155–355)
WBC # BLD AUTO: 4.04 THOUSAND/UL (ref 4.31–10.16)

## 2024-04-29 ENCOUNTER — TELEPHONE (OUTPATIENT)
Age: 39
End: 2024-04-29

## 2024-04-29 ENCOUNTER — PATIENT MESSAGE (OUTPATIENT)
Age: 39
End: 2024-04-29

## 2024-04-30 NOTE — TELEPHONE ENCOUNTER
"Dr. Garcia reviewed the COVID vaccine exception form from a pervious patient message/encounter form and said she \"I do not see any of the above contradictions in her chart\" and I called the patient and let her know and she said she will contact HR and speak with them   "

## 2024-04-30 NOTE — TELEPHONE ENCOUNTER
Pt states that she was advised to call office back after speaking with her HR department to find out if she can have a letter written by her provider instead of having the form filled out. Pt's HR department will accept a letter from provider for covid vaccine exemption.

## 2024-05-03 ENCOUNTER — APPOINTMENT (OUTPATIENT)
Dept: URGENT CARE | Facility: MEDICAL CENTER | Age: 39
End: 2024-05-03

## 2024-05-03 ENCOUNTER — APPOINTMENT (OUTPATIENT)
Dept: LAB | Facility: MEDICAL CENTER | Age: 39
End: 2024-05-03

## 2024-05-03 DIAGNOSIS — Z02.1 PHYSICAL EXAM, PRE-EMPLOYMENT: ICD-10-CM

## 2024-05-03 LAB
MEV IGG SER QL IA: NORMAL
MUV IGG SER QL IA: NORMAL
RUBV IGG SERPL IA-ACNC: 122.9 IU/ML
VZV IGG SER QL IA: NORMAL

## 2024-05-03 PROCEDURE — 86762 RUBELLA ANTIBODY: CPT

## 2024-05-03 PROCEDURE — 86735 MUMPS ANTIBODY: CPT

## 2024-05-03 PROCEDURE — 36415 COLL VENOUS BLD VENIPUNCTURE: CPT

## 2024-05-03 PROCEDURE — 86765 RUBEOLA ANTIBODY: CPT

## 2024-05-03 PROCEDURE — 86787 VARICELLA-ZOSTER ANTIBODY: CPT

## 2024-05-30 ENCOUNTER — VBI (OUTPATIENT)
Dept: ADMINISTRATIVE | Facility: OTHER | Age: 39
End: 2024-05-30

## 2024-07-03 ENCOUNTER — APPOINTMENT (OUTPATIENT)
Dept: LAB | Facility: HOSPITAL | Age: 39
End: 2024-07-03
Payer: COMMERCIAL

## 2024-07-03 DIAGNOSIS — Z00.8 HEALTH EXAMINATION IN POPULATION SURVEY: ICD-10-CM

## 2024-07-03 LAB
CHOLEST SERPL-MCNC: 191 MG/DL
EST. AVERAGE GLUCOSE BLD GHB EST-MCNC: 100 MG/DL
HBA1C MFR BLD: 5.1 %
HDLC SERPL-MCNC: 55 MG/DL
LDLC SERPL CALC-MCNC: 116 MG/DL (ref 0–100)
NONHDLC SERPL-MCNC: 136 MG/DL
TRIGL SERPL-MCNC: 102 MG/DL

## 2024-07-03 PROCEDURE — 83036 HEMOGLOBIN GLYCOSYLATED A1C: CPT

## 2024-07-03 PROCEDURE — 80061 LIPID PANEL: CPT

## 2024-07-03 PROCEDURE — 36415 COLL VENOUS BLD VENIPUNCTURE: CPT

## 2024-09-25 ENCOUNTER — PATIENT MESSAGE (OUTPATIENT)
Dept: FAMILY MEDICINE CLINIC | Facility: MEDICAL CENTER | Age: 39
End: 2024-09-25

## 2024-09-30 NOTE — PATIENT COMMUNICATION
Patient is calling in to follow up on medical exemption form.  She needs to have it in by tomorrow.  Please advise and call patient when ready.

## 2024-10-01 ENCOUNTER — TELEPHONE (OUTPATIENT)
Age: 39
End: 2024-10-01

## 2024-11-05 ENCOUNTER — TELEPHONE (OUTPATIENT)
Age: 39
End: 2024-11-05

## 2024-11-15 ENCOUNTER — TELEPHONE (OUTPATIENT)
Dept: HEMATOLOGY ONCOLOGY | Facility: CLINIC | Age: 39
End: 2024-11-15

## 2024-11-15 DIAGNOSIS — D70.9 NEUTROPENIA, UNSPECIFIED TYPE (HCC): Primary | ICD-10-CM

## 2024-11-15 DIAGNOSIS — D50.0 IRON DEFICIENCY ANEMIA DUE TO CHRONIC BLOOD LOSS: ICD-10-CM
